# Patient Record
Sex: MALE | Race: WHITE | NOT HISPANIC OR LATINO | Employment: OTHER | URBAN - METROPOLITAN AREA
[De-identification: names, ages, dates, MRNs, and addresses within clinical notes are randomized per-mention and may not be internally consistent; named-entity substitution may affect disease eponyms.]

---

## 2023-04-20 LAB
ALANINE AMINOTRANSFERASE (SGPT) (U/L) IN SER/PLAS: 16 U/L (ref 10–52)
ALBUMIN (G/DL) IN SER/PLAS: 4.6 G/DL (ref 3.4–5)
ALKALINE PHOSPHATASE (U/L) IN SER/PLAS: 63 U/L (ref 33–136)
ANION GAP IN SER/PLAS: 11 MMOL/L (ref 10–20)
ASPARTATE AMINOTRANSFERASE (SGOT) (U/L) IN SER/PLAS: 26 U/L (ref 9–39)
BASOPHILS (10*3/UL) IN BLOOD BY AUTOMATED COUNT: 0.03 X10E9/L (ref 0–0.1)
BASOPHILS/100 LEUKOCYTES IN BLOOD BY AUTOMATED COUNT: 0.7 % (ref 0–2)
BILIRUBIN TOTAL (MG/DL) IN SER/PLAS: 0.6 MG/DL (ref 0–1.2)
CALCIUM (MG/DL) IN SER/PLAS: 9.4 MG/DL (ref 8.6–10.3)
CARBON DIOXIDE, TOTAL (MMOL/L) IN SER/PLAS: 27 MMOL/L (ref 21–32)
CHLORIDE (MMOL/L) IN SER/PLAS: 97 MMOL/L (ref 98–107)
CHOLESTEROL (MG/DL) IN SER/PLAS: 177 MG/DL (ref 0–199)
CHOLESTEROL IN HDL (MG/DL) IN SER/PLAS: 69.4 MG/DL
CHOLESTEROL/HDL RATIO: 2.6
COBALAMIN (VITAMIN B12) (PG/ML) IN SER/PLAS: 1233 PG/ML (ref 211–911)
CREATININE (MG/DL) IN SER/PLAS: 1.12 MG/DL (ref 0.5–1.3)
EOSINOPHILS (10*3/UL) IN BLOOD BY AUTOMATED COUNT: 0.21 X10E9/L (ref 0–0.4)
EOSINOPHILS/100 LEUKOCYTES IN BLOOD BY AUTOMATED COUNT: 4.8 % (ref 0–6)
ERYTHROCYTE DISTRIBUTION WIDTH (RATIO) BY AUTOMATED COUNT: 12.6 % (ref 11.5–14.5)
ERYTHROCYTE MEAN CORPUSCULAR HEMOGLOBIN CONCENTRATION (G/DL) BY AUTOMATED: 32.6 G/DL (ref 32–36)
ERYTHROCYTE MEAN CORPUSCULAR VOLUME (FL) BY AUTOMATED COUNT: 98 FL (ref 80–100)
ERYTHROCYTES (10*6/UL) IN BLOOD BY AUTOMATED COUNT: 4.24 X10E12/L (ref 4.5–5.9)
GFR MALE: 67 ML/MIN/1.73M2
GLUCOSE (MG/DL) IN SER/PLAS: 95 MG/DL (ref 74–99)
HEMATOCRIT (%) IN BLOOD BY AUTOMATED COUNT: 41.7 % (ref 41–52)
HEMOGLOBIN (G/DL) IN BLOOD: 13.6 G/DL (ref 13.5–17.5)
IMMATURE GRANULOCYTES/100 LEUKOCYTES IN BLOOD BY AUTOMATED COUNT: 0.2 % (ref 0–0.9)
LDL: 94 MG/DL (ref 0–99)
LEUKOCYTES (10*3/UL) IN BLOOD BY AUTOMATED COUNT: 4.4 X10E9/L (ref 4.4–11.3)
LYMPHOCYTES (10*3/UL) IN BLOOD BY AUTOMATED COUNT: 0.89 X10E9/L (ref 0.8–3)
LYMPHOCYTES/100 LEUKOCYTES IN BLOOD BY AUTOMATED COUNT: 20.3 % (ref 13–44)
MONOCYTES (10*3/UL) IN BLOOD BY AUTOMATED COUNT: 0.58 X10E9/L (ref 0.05–0.8)
MONOCYTES/100 LEUKOCYTES IN BLOOD BY AUTOMATED COUNT: 13.2 % (ref 2–10)
NEUTROPHILS (10*3/UL) IN BLOOD BY AUTOMATED COUNT: 2.66 X10E9/L (ref 1.6–5.5)
NEUTROPHILS/100 LEUKOCYTES IN BLOOD BY AUTOMATED COUNT: 60.8 % (ref 40–80)
NRBC (PER 100 WBCS) BY AUTOMATED COUNT: 0 /100 WBC (ref 0–0)
PLATELETS (10*3/UL) IN BLOOD AUTOMATED COUNT: 253 X10E9/L (ref 150–450)
POTASSIUM (MMOL/L) IN SER/PLAS: 4.7 MMOL/L (ref 3.5–5.3)
PROTEIN TOTAL: 8.3 G/DL (ref 6.4–8.2)
SEDIMENTATION RATE, ERYTHROCYTE: 11 MM/H (ref 0–20)
SODIUM (MMOL/L) IN SER/PLAS: 130 MMOL/L (ref 136–145)
THYROTROPIN (MIU/L) IN SER/PLAS BY DETECTION LIMIT <= 0.05 MIU/L: 2 MIU/L (ref 0.44–3.98)
TRIGLYCERIDE (MG/DL) IN SER/PLAS: 69 MG/DL (ref 0–149)
UREA NITROGEN (MG/DL) IN SER/PLAS: 21 MG/DL (ref 6–23)
VLDL: 14 MG/DL (ref 0–40)

## 2023-04-27 LAB
ALBUMIN ELP: 4.7 G/DL (ref 3.4–5)
ALPHA 1: 0.3 G/DL (ref 0.2–0.6)
ALPHA 2: 0.8 G/DL (ref 0.4–1.1)
BETA: 0.9 G/DL (ref 0.5–1.2)
GAMMA GLOBULIN: 1.6 G/DL (ref 0.5–1.4)
M-PROTEIN 1: 0.8 G/DL
PATH REVIEW - SERUM IMMUNOFIXATION: NORMAL
PATH REVIEW-SERUM PROTEIN ELECTROPHORESIS: NORMAL
PROTEIN ELECTROPHORESIS INTERPRETATION: ABNORMAL
PROTEIN TOTAL: 8.3 G/DL (ref 6.4–8.2)
SERUM IMMUNOFIXATION INTERPRETATION: ABNORMAL

## 2023-08-18 LAB
ALANINE AMINOTRANSFERASE (SGPT) (U/L) IN SER/PLAS: 16 U/L (ref 10–52)
ALBUMIN (G/DL) IN SER/PLAS: 4.4 G/DL (ref 3.4–5)
ALKALINE PHOSPHATASE (U/L) IN SER/PLAS: 47 U/L (ref 33–136)
ANION GAP IN SER/PLAS: 11 MMOL/L (ref 10–20)
ASPARTATE AMINOTRANSFERASE (SGOT) (U/L) IN SER/PLAS: 24 U/L (ref 9–39)
BASOPHILS (10*3/UL) IN BLOOD BY AUTOMATED COUNT: 0.04 X10E9/L (ref 0–0.1)
BASOPHILS/100 LEUKOCYTES IN BLOOD BY AUTOMATED COUNT: 1 % (ref 0–2)
BILIRUBIN TOTAL (MG/DL) IN SER/PLAS: 0.4 MG/DL (ref 0–1.2)
CALCIUM (MG/DL) IN SER/PLAS: 9.6 MG/DL (ref 8.6–10.3)
CARBON DIOXIDE, TOTAL (MMOL/L) IN SER/PLAS: 29 MMOL/L (ref 21–32)
CHLORIDE (MMOL/L) IN SER/PLAS: 96 MMOL/L (ref 98–107)
CHOLESTEROL (MG/DL) IN SER/PLAS: 163 MG/DL (ref 0–199)
CHOLESTEROL IN HDL (MG/DL) IN SER/PLAS: 63.3 MG/DL
CHOLESTEROL/HDL RATIO: 2.6
CREATININE (MG/DL) IN SER/PLAS: 1.1 MG/DL (ref 0.5–1.3)
EOSINOPHILS (10*3/UL) IN BLOOD BY AUTOMATED COUNT: 0.18 X10E9/L (ref 0–0.4)
EOSINOPHILS/100 LEUKOCYTES IN BLOOD BY AUTOMATED COUNT: 4.7 % (ref 0–6)
ERYTHROCYTE DISTRIBUTION WIDTH (RATIO) BY AUTOMATED COUNT: 12.7 % (ref 11.5–14.5)
ERYTHROCYTE MEAN CORPUSCULAR HEMOGLOBIN CONCENTRATION (G/DL) BY AUTOMATED: 32 G/DL (ref 32–36)
ERYTHROCYTE MEAN CORPUSCULAR VOLUME (FL) BY AUTOMATED COUNT: 98 FL (ref 80–100)
ERYTHROCYTES (10*6/UL) IN BLOOD BY AUTOMATED COUNT: 4.23 X10E12/L (ref 4.5–5.9)
GFR MALE: 68 ML/MIN/1.73M2
GLUCOSE (MG/DL) IN SER/PLAS: 84 MG/DL (ref 74–99)
HEMATOCRIT (%) IN BLOOD BY AUTOMATED COUNT: 41.6 % (ref 41–52)
HEMOGLOBIN (G/DL) IN BLOOD: 13.3 G/DL (ref 13.5–17.5)
IGA (MG/DL) IN SER/PLAS: 190 MG/DL (ref 70–400)
IGG (MG/DL) IN SER/PLAS: 1420 MG/DL (ref 700–1600)
IGM (MG/DL) IN SER/PLAS: 44 MG/DL (ref 40–230)
IMMATURE GRANULOCYTES/100 LEUKOCYTES IN BLOOD BY AUTOMATED COUNT: 0 % (ref 0–0.9)
LDL: 83 MG/DL (ref 0–99)
LEUKOCYTES (10*3/UL) IN BLOOD BY AUTOMATED COUNT: 3.9 X10E9/L (ref 4.4–11.3)
LYMPHOCYTES (10*3/UL) IN BLOOD BY AUTOMATED COUNT: 0.75 X10E9/L (ref 0.8–3)
LYMPHOCYTES/100 LEUKOCYTES IN BLOOD BY AUTOMATED COUNT: 19.5 % (ref 13–44)
MONOCYTES (10*3/UL) IN BLOOD BY AUTOMATED COUNT: 0.44 X10E9/L (ref 0.05–0.8)
MONOCYTES/100 LEUKOCYTES IN BLOOD BY AUTOMATED COUNT: 11.4 % (ref 2–10)
NEUTROPHILS (10*3/UL) IN BLOOD BY AUTOMATED COUNT: 2.44 X10E9/L (ref 1.6–5.5)
NEUTROPHILS/100 LEUKOCYTES IN BLOOD BY AUTOMATED COUNT: 63.4 % (ref 40–80)
NRBC (PER 100 WBCS) BY AUTOMATED COUNT: 0 /100 WBC (ref 0–0)
OSMOLALITY, RANDOM URINE: 387 MOSM/KG (ref 200–1200)
OSMOLALITY, SERUM: 280 MOSM/KG H2O (ref 280–300)
PLATELETS (10*3/UL) IN BLOOD AUTOMATED COUNT: 229 X10E9/L (ref 150–450)
POTASSIUM (MMOL/L) IN SER/PLAS: 5.1 MMOL/L (ref 3.5–5.3)
PROTEIN TOTAL: 7.9 G/DL (ref 6.4–8.2)
SEDIMENTATION RATE, ERYTHROCYTE: 20 MM/H (ref 0–20)
SODIUM (MMOL/L) IN SER/PLAS: 131 MMOL/L (ref 136–145)
TRIGLYCERIDE (MG/DL) IN SER/PLAS: 83 MG/DL (ref 0–149)
UREA NITROGEN (MG/DL) IN SER/PLAS: 16 MG/DL (ref 6–23)
VLDL: 17 MG/DL (ref 0–40)

## 2023-08-23 LAB
ALBUMIN ELP: 4.6 G/DL (ref 3.4–5)
ALPHA 1: 0.3 G/DL (ref 0.2–0.6)
ALPHA 2: 0.7 G/DL (ref 0.4–1.1)
BETA: 0.8 G/DL (ref 0.5–1.2)
GAMMA GLOBULIN: 1.5 G/DL (ref 0.5–1.4)
M-PROTEIN 1: 0.8 G/DL
PATH REVIEW-SERUM PROTEIN ELECTROPHORESIS: NORMAL
PROTEIN ELECTROPHORESIS INTERPRETATION: ABNORMAL
PROTEIN TOTAL: 7.9 G/DL (ref 6.4–8.2)

## 2023-08-27 PROBLEM — R29.898 COGWHEEL RIGIDITY: Status: ACTIVE | Noted: 2023-08-27

## 2023-08-27 PROBLEM — J34.89 NASAL OBSTRUCTION: Status: ACTIVE | Noted: 2023-08-27

## 2023-08-27 PROBLEM — I25.10 CORONARY ARTERY ARTERIOSCLEROSIS: Status: ACTIVE | Noted: 2023-08-27

## 2023-08-27 PROBLEM — I48.0 PAROXYSMAL ATRIAL FIBRILLATION (MULTI): Status: ACTIVE | Noted: 2023-08-27

## 2023-08-27 PROBLEM — E78.01 FAMILIAL HYPERCHOLESTEREMIA: Status: ACTIVE | Noted: 2023-08-27

## 2023-08-27 PROBLEM — E87.1 HYPONATREMIA: Status: ACTIVE | Noted: 2023-08-27

## 2023-08-27 PROBLEM — M65.30 RIGHT TRIGGER FINGER: Status: ACTIVE | Noted: 2023-08-27

## 2023-08-27 PROBLEM — J34.2 NASAL SEPTAL DEVIATION: Status: ACTIVE | Noted: 2023-08-27

## 2023-08-27 PROBLEM — I47.19 ATRIAL TACHYCARDIA, PAROXYSMAL (CMS-HCC): Status: ACTIVE | Noted: 2023-08-27

## 2023-08-27 PROBLEM — I49.1 PAC (PREMATURE ATRIAL CONTRACTION): Status: ACTIVE | Noted: 2023-08-27

## 2023-08-27 PROBLEM — K52.9 CHRONIC DIARRHEA: Status: ACTIVE | Noted: 2023-08-27

## 2023-08-27 PROBLEM — I48.92 ATRIAL FLUTTER (MULTI): Status: ACTIVE | Noted: 2023-08-27

## 2023-08-27 PROBLEM — I49.3 PVC (PREMATURE VENTRICULAR CONTRACTION): Status: ACTIVE | Noted: 2023-08-27

## 2023-08-27 PROBLEM — D75.89 MACROCYTOSIS WITHOUT ANEMIA: Status: ACTIVE | Noted: 2023-08-27

## 2023-08-27 PROBLEM — I50.42 CHRONIC COMBINED SYSTOLIC AND DIASTOLIC HEART FAILURE, NYHA CLASS 2 (MULTI): Status: ACTIVE | Noted: 2023-08-27

## 2023-08-27 PROBLEM — E78.5 HYPERLIPIDEMIA: Status: ACTIVE | Noted: 2023-08-27

## 2023-08-27 PROBLEM — I20.9 ANGINA PECTORIS (CMS-HCC): Status: ACTIVE | Noted: 2023-08-27

## 2023-08-27 PROBLEM — I48.19 PERSISTENT ATRIAL FIBRILLATION (MULTI): Status: ACTIVE | Noted: 2023-08-27

## 2023-08-27 PROBLEM — I50.20 SYSTOLIC HEART FAILURE (MULTI): Status: ACTIVE | Noted: 2023-08-27

## 2023-08-27 PROBLEM — D64.9 ANEMIA: Status: ACTIVE | Noted: 2023-08-27

## 2023-08-27 PROBLEM — J44.9 COPD (CHRONIC OBSTRUCTIVE PULMONARY DISEASE) (MULTI): Status: ACTIVE | Noted: 2023-08-27

## 2023-08-27 PROBLEM — M65.311 TRIGGER FINGER OF RIGHT THUMB: Status: ACTIVE | Noted: 2023-08-27

## 2023-08-27 PROBLEM — D47.2 MONOCLONAL GAMMOPATHY OF UNKNOWN SIGNIFICANCE: Status: ACTIVE | Noted: 2023-08-27

## 2023-08-27 PROBLEM — Z95.810 ICD (IMPLANTABLE CARDIOVERTER-DEFIBRILLATOR) IN PLACE: Status: ACTIVE | Noted: 2023-08-27

## 2023-08-27 PROBLEM — N52.9 ERECTILE DYSFUNCTION: Status: ACTIVE | Noted: 2023-08-27

## 2023-08-27 PROBLEM — I10 HTN (HYPERTENSION): Status: ACTIVE | Noted: 2023-08-27

## 2023-08-27 PROBLEM — J32.9 CHRONIC SINUSITIS: Status: ACTIVE | Noted: 2023-08-27

## 2023-08-27 PROBLEM — I25.5 ISCHEMIC CARDIOMYOPATHY: Status: ACTIVE | Noted: 2023-08-27

## 2023-08-27 PROBLEM — K40.90 LEFT INGUINAL HERNIA: Status: ACTIVE | Noted: 2023-08-27

## 2023-08-27 PROBLEM — M25.562 KNEE PAIN, LEFT: Status: ACTIVE | Noted: 2023-08-27

## 2023-08-27 RX ORDER — MULTIVITAMIN
1 TABLET ORAL DAILY
COMMUNITY
Start: 2021-03-29

## 2023-08-27 RX ORDER — BIOTIN 1 MG
1 TABLET ORAL DAILY
COMMUNITY
End: 2024-04-01 | Stop reason: WASHOUT

## 2023-08-27 RX ORDER — PLANT STANOL ESTER 450 MG
8000 TABLET ORAL DAILY
COMMUNITY
Start: 2021-03-29

## 2023-08-27 RX ORDER — SPIRONOLACTONE 25 MG/1
12.5 TABLET ORAL DAILY
COMMUNITY
End: 2024-03-04

## 2023-08-27 RX ORDER — EPINEPHRINE 0.22MG
100 AEROSOL WITH ADAPTER (ML) INHALATION DAILY
COMMUNITY

## 2023-08-27 RX ORDER — CARVEDILOL 6.25 MG/1
6.25 TABLET ORAL 2 TIMES DAILY
COMMUNITY
End: 2023-10-05 | Stop reason: ALTCHOICE

## 2023-08-27 RX ORDER — ROSUVASTATIN CALCIUM 20 MG/1
1 TABLET, COATED ORAL DAILY
COMMUNITY
Start: 2016-10-03

## 2023-08-27 RX ORDER — METHYLPREDNISOLONE 4 MG/1
TABLET ORAL
COMMUNITY
Start: 2022-10-20 | End: 2023-10-05 | Stop reason: ALTCHOICE

## 2023-08-27 RX ORDER — LANOLIN ALCOHOL/MO/W.PET/CERES
100 CREAM (GRAM) TOPICAL DAILY
COMMUNITY
End: 2024-01-03 | Stop reason: ENTERED-IN-ERROR

## 2023-08-27 RX ORDER — METOPROLOL SUCCINATE 25 MG/1
1 TABLET, EXTENDED RELEASE ORAL DAILY
COMMUNITY
Start: 2022-09-20

## 2023-08-27 RX ORDER — PETROLATUM,WHITE/LANOLIN
2 OINTMENT (GRAM) TOPICAL DAILY
COMMUNITY

## 2023-08-27 RX ORDER — DABIGATRAN ETEXILATE 150 MG/1
150 CAPSULE ORAL 2 TIMES DAILY
COMMUNITY
Start: 2022-09-23 | End: 2023-10-05 | Stop reason: ALTCHOICE

## 2023-08-27 RX ORDER — SACUBITRIL AND VALSARTAN 97; 103 MG/1; MG/1
1 TABLET, FILM COATED ORAL 2 TIMES DAILY
COMMUNITY
Start: 2017-01-12 | End: 2023-11-14 | Stop reason: SDUPTHER

## 2023-08-27 RX ORDER — FERROUS SULFATE 325(65) MG
65 TABLET ORAL DAILY
COMMUNITY
End: 2024-01-03 | Stop reason: ENTERED-IN-ERROR

## 2023-08-27 RX ORDER — ISOSORBIDE MONONITRATE 30 MG/1
30 TABLET, EXTENDED RELEASE ORAL EVERY MORNING
COMMUNITY
End: 2023-10-05 | Stop reason: ALTCHOICE

## 2023-08-27 RX ORDER — GLUCOSAMINE/CHONDR SU A SOD 167-133 MG
CAPSULE ORAL DAILY
COMMUNITY
End: 2023-10-05 | Stop reason: ALTCHOICE

## 2023-08-27 RX ORDER — DOXYCYCLINE HYCLATE 100 MG
1 TABLET ORAL EVERY 12 HOURS
COMMUNITY
Start: 2022-10-20 | End: 2024-01-03 | Stop reason: ENTERED-IN-ERROR

## 2023-08-27 RX ORDER — FUROSEMIDE 20 MG/1
1 TABLET ORAL DAILY PRN
COMMUNITY
Start: 2021-10-26

## 2023-10-05 ENCOUNTER — OFFICE VISIT (OUTPATIENT)
Dept: CARDIOLOGY | Facility: CLINIC | Age: 78
End: 2023-10-05
Payer: MEDICARE

## 2023-10-05 VITALS
DIASTOLIC BLOOD PRESSURE: 72 MMHG | SYSTOLIC BLOOD PRESSURE: 126 MMHG | HEIGHT: 69 IN | HEART RATE: 76 BPM | BODY MASS INDEX: 23.4 KG/M2 | OXYGEN SATURATION: 96 % | WEIGHT: 158 LBS

## 2023-10-05 DIAGNOSIS — E78.5 HYPERLIPIDEMIA: ICD-10-CM

## 2023-10-05 DIAGNOSIS — I48.0 PAROXYSMAL ATRIAL FIBRILLATION (MULTI): ICD-10-CM

## 2023-10-05 DIAGNOSIS — N52.9 ERECTILE DISORDER: ICD-10-CM

## 2023-10-05 DIAGNOSIS — I50.42 CHRONIC COMBINED SYSTOLIC AND DIASTOLIC HEART FAILURE, NYHA CLASS 2 (MULTI): Primary | ICD-10-CM

## 2023-10-05 DIAGNOSIS — I25.10 CAD (CORONARY ARTERY DISEASE): ICD-10-CM

## 2023-10-05 PROCEDURE — 1159F MED LIST DOCD IN RCRD: CPT | Performed by: INTERNAL MEDICINE

## 2023-10-05 PROCEDURE — 1160F RVW MEDS BY RX/DR IN RCRD: CPT | Performed by: INTERNAL MEDICINE

## 2023-10-05 PROCEDURE — 3074F SYST BP LT 130 MM HG: CPT | Performed by: INTERNAL MEDICINE

## 2023-10-05 PROCEDURE — 3078F DIAST BP <80 MM HG: CPT | Performed by: INTERNAL MEDICINE

## 2023-10-05 PROCEDURE — 1036F TOBACCO NON-USER: CPT | Performed by: INTERNAL MEDICINE

## 2023-10-05 PROCEDURE — 1126F AMNT PAIN NOTED NONE PRSNT: CPT | Performed by: INTERNAL MEDICINE

## 2023-10-05 PROCEDURE — 99214 OFFICE O/P EST MOD 30 MIN: CPT | Performed by: INTERNAL MEDICINE

## 2023-10-05 RX ORDER — SILDENAFIL 50 MG/1
50 TABLET, FILM COATED ORAL DAILY PRN
Qty: 50 TABLET | Refills: 1 | Status: SHIPPED | OUTPATIENT
Start: 2023-10-05 | End: 2023-10-05 | Stop reason: SDUPTHER

## 2023-10-05 RX ORDER — SILDENAFIL 50 MG/1
50 TABLET, FILM COATED ORAL DAILY PRN
Qty: 50 TABLET | Refills: 1 | Status: SHIPPED | OUTPATIENT
Start: 2023-10-05 | End: 2024-04-30 | Stop reason: WASHOUT

## 2023-10-05 ASSESSMENT — PAIN SCALES - GENERAL: PAINLEVEL: 0-NO PAIN

## 2023-10-05 ASSESSMENT — ENCOUNTER SYMPTOMS
DEPRESSION: 0
OCCASIONAL FEELINGS OF UNSTEADINESS: 1
LOSS OF SENSATION IN FEET: 0

## 2023-10-05 NOTE — PROGRESS NOTES
"Chief Complaint:   No chief complaint on file.     History Of Present Illness:    Ab Kendrick is a 78 y.o. male with a history of chronic systolic and diastolic heart failure, CAD with prior LAD and RCA stenting (anterolateral STEMI 12/2/2010 with PCI to the LAD and staged PCI to the RCA), dyslipidemia, hypertension, ventricular tachycardia with [employer] ICD (MRI safe), chronic atrial fibrillation on long-term oral anticoagulation, and pulmonary hypertension here for routine follow-up.     He has done really well with the changes in his medicines.  He denies any exertional chest pain or shortness of breath.      He has been diagnosed with essential tremor and was asked to bring up the idea of using propranolol.  We talked about that.    He also notices that he is now walking a straight line when he starts to walk.  This developed after he saw his neurologist.    He also notices low back pain with doing things like planting 200 garlic bulbs    He is also noticed erectile dysfunction since starting on the Entresto and spironolactone.     Echocardiogram 9/27/2021: EF 38% with moderately dilated LV. Mild left atrial enlargement. ICD/pacer leads noted in the RA and RV. Mild MR and moderate TR. Trace AI. RVSP 57 mmHg.     Catheterization 1/31/2017: Patent LAD and RCA stents. Moderate pulmonary hypertension with elevated LVEDP of 25 mmHg. EF 20%.     PCI to the LAD 12/2/2010 with HUGH. PCI to RCA 5/19/2011      Last Recorded Vitals:  Vitals:    10/05/23 1103   BP: 126/72   BP Location: Right arm   Patient Position: Sitting   Pulse: 76   SpO2: 96%   Weight: 71.7 kg (158 lb)   Height: 1.753 m (5' 9\")       Past Medical History:  He has a past medical history of CAD (coronary artery disease) (08/27/2023), Chronic combined systolic and diastolic heart failure, NYHA class 2 (CMS/HCC) (08/27/2023), Chronic rhinitis, Diverticulosis of intestine, part unspecified, without perforation or abscess without bleeding, HTN " (hypertension) (08/27/2023), Hyperlipidemia (08/27/2023), and Persistent atrial fibrillation (CMS/HCC) (08/27/2023).    Past Surgical History:  He has a past surgical history that includes Other surgical history (10/19/2021); Other surgical history (10/19/2021); Other surgical history (10/19/2021); Other surgical history (10/19/2021); Other surgical history (10/19/2021); Coronary angioplasty with stent (12/02/2010); Coronary angioplasty with stent (05/19/2011); and Cardiac catheterization (01/31/2017).      Social History:  He reports that he has never smoked. He has never used smokeless tobacco. He reports current alcohol use of about 8.0 standard drinks of alcohol per week. He reports that he does not use drugs.    Family History:  Family History   Problem Relation Name Age of Onset    Prostate cancer Brother          Allergies:  Patient has no known allergies.    Outpatient Medications:  Current Outpatient Medications   Medication Instructions    apixaban (ELIQUIS) 5 mg, oral, 2 times daily    biotin 1 mg tablet 1 tablet, oral, Daily    carvedilol (COREG) 6.25 mg, oral, 2 times daily    coenzyme Q-10 100 mg, oral, Daily    cyanocobalamin (VITAMIN B-12) 100 mcg, oral, Daily    dabigatran etexilate (Pradaxa) 150 mg capsule 1 capsule, oral, 2 times daily    doxycycline (Vibra-Tabs) 100 mg tablet 1 tablet, oral, Every 12 hours    ferrous sulfate 325 (65 Fe) MG tablet 65 mg of iron, oral, Daily, With food<BR>    furosemide (Lasix) 20 mg tablet 1 tablet, oral, Daily PRN    glucosamine sulfate 1,000 mg capsule 2 capsules, oral, Daily    isosorbide mononitrate ER (IMDUR) 30 mg, oral, Every morning    methylPREDNISolone (Medrol Dospak) 4 mg tablets oral    metoprolol succinate XL (Toprol-XL) 25 mg 24 hr tablet 1 tablet, oral, Daily    multivitamin tablet 1 tablet, oral, Daily    niacin 500 mg tablet oral, Daily    rosuvastatin (Crestor) 20 mg tablet 1 tablet, oral, Daily    sacubitriL-valsartan (Entresto)  mg tablet  "1 tablet, oral, 2 times daily    spironolactone (ALDACTONE) 12.5 mg, oral, Daily    vitamin A 8,000 Units, oral, Daily       Physical Exam:  In general: alert and in no acute distress.   HEENT: Mucous membranes moist, carotid upstrokes normal with no bruits. JVP is mildly elevated no cervical lymphadenopathy. Cranial nerves II-XII grossly intact.  Pulmonary: Clear to auscultation bilaterally.  Cardiovascular: S1,S2, regular. 2 out of 6 systolic murmur at the apex.  Lower extremities: Warm. 2+ distal pulses. No edema.  Skin: warm and dry. No obvious rashes visualized.  Psychiatric: Oriented to person, place and time. No obvious agitation.     Last Labs:  CBC -  Lab Results   Component Value Date    WBC 3.9 (L) 08/18/2023    HGB 13.3 (L) 08/18/2023    HCT 41.6 08/18/2023    MCV 98 08/18/2023     08/18/2023       CMP -  Lab Results   Component Value Date    CALCIUM 9.6 08/18/2023    PROT 7.9 08/18/2023    PROT 7.9 08/18/2023    ALBUMIN 4.4 08/18/2023    AST 24 08/18/2023    ALT 16 08/18/2023    ALKPHOS 47 08/18/2023    BILITOT 0.4 08/18/2023       LIPID PANEL -   Lab Results   Component Value Date    CHOL 163 08/18/2023    TRIG 83 08/18/2023    HDL 63.3 08/18/2023    CHHDL 2.6 08/18/2023    LDLF 83 08/18/2023    VLDL 17 08/18/2023       RENAL FUNCTION PANEL -   Lab Results   Component Value Date    GLUCOSE 84 08/18/2023     (L) 08/18/2023    K 5.1 08/18/2023    CL 96 (L) 08/18/2023    CO2 29 08/18/2023    ANIONGAP 11 08/18/2023    BUN 16 08/18/2023    CREATININE 1.10 08/18/2023    GFRMALE 68 08/18/2023    CALCIUM 9.6 08/18/2023    ALBUMIN 4.4 08/18/2023        No results found for: \"BNP\", \"HGBA1C\"      Assessment/Plan   1) chronic combined systolic and diastolic heart failure: He is euvolemic.  He is really doing well with his current regimen.  Would like to hold off on SGLT2 inhibitors at this time.     2) CAD: Stable from a symptom standpoint. Based on the ISCHEMIA Trial there is no need for routine " stress testing.     3) dyslipidemia: Continue with rosuvastatin 20 mg daily.     4) paroxysmal atrial fibrillation: Getting anticoagulant from [facility].       5) erectile dysfunction: Given prescription for Viagra    6) low back pain: Given instructions to try some physical therapy for his low back.    7) follow-up: 6 months or sooner if needed       Rodriguez Clements MD

## 2023-11-09 ENCOUNTER — HOSPITAL ENCOUNTER (OUTPATIENT)
Dept: CARDIOLOGY | Facility: HOSPITAL | Age: 78
Discharge: HOME | End: 2023-11-09
Payer: MEDICARE

## 2023-11-09 DIAGNOSIS — Z95.810 PRESENCE OF AUTOMATIC CARDIOVERTER/DEFIBRILLATOR (AICD): Primary | ICD-10-CM

## 2023-11-09 DIAGNOSIS — Z95.810 PRESENCE OF AUTOMATIC CARDIOVERTER/DEFIBRILLATOR (AICD): ICD-10-CM

## 2023-11-09 DIAGNOSIS — I47.29 VENTRICULAR TACHYCARDIA (PAROXYSMAL) (MULTI): ICD-10-CM

## 2023-11-09 PROCEDURE — 93296 REM INTERROG EVL PM/IDS: CPT

## 2023-11-09 PROCEDURE — 93295 DEV INTERROG REMOTE 1/2/MLT: CPT | Performed by: INTERNAL MEDICINE

## 2023-11-14 DIAGNOSIS — I50.42 CHRONIC COMBINED SYSTOLIC AND DIASTOLIC HEART FAILURE, NYHA CLASS 2 (MULTI): Primary | ICD-10-CM

## 2023-11-14 DIAGNOSIS — N52.9 ERECTILE DISORDER: ICD-10-CM

## 2023-11-14 RX ORDER — SACUBITRIL AND VALSARTAN 97; 103 MG/1; MG/1
1 TABLET, FILM COATED ORAL 2 TIMES DAILY
Qty: 180 TABLET | Refills: 3 | Status: SHIPPED | OUTPATIENT
Start: 2023-11-14 | End: 2024-04-11 | Stop reason: SDUPTHER

## 2023-11-21 ENCOUNTER — OFFICE VISIT (OUTPATIENT)
Dept: SURGERY | Facility: CLINIC | Age: 78
End: 2023-11-21
Payer: MEDICARE

## 2023-11-21 VITALS
DIASTOLIC BLOOD PRESSURE: 71 MMHG | HEART RATE: 76 BPM | BODY MASS INDEX: 23.95 KG/M2 | WEIGHT: 158 LBS | OXYGEN SATURATION: 99 % | HEIGHT: 68 IN | SYSTOLIC BLOOD PRESSURE: 113 MMHG | RESPIRATION RATE: 16 BRPM | TEMPERATURE: 97.4 F

## 2023-11-21 DIAGNOSIS — I48.91 ATRIAL FIBRILLATION AND FLUTTER (MULTI): ICD-10-CM

## 2023-11-21 DIAGNOSIS — K40.90 NON-RECURRENT UNILATERAL INGUINAL HERNIA WITHOUT OBSTRUCTION OR GANGRENE: Primary | ICD-10-CM

## 2023-11-21 DIAGNOSIS — I48.92 ATRIAL FIBRILLATION AND FLUTTER (MULTI): ICD-10-CM

## 2023-11-21 PROCEDURE — 1159F MED LIST DOCD IN RCRD: CPT | Performed by: SURGERY

## 2023-11-21 PROCEDURE — 1036F TOBACCO NON-USER: CPT | Performed by: SURGERY

## 2023-11-21 PROCEDURE — 1126F AMNT PAIN NOTED NONE PRSNT: CPT | Performed by: SURGERY

## 2023-11-21 PROCEDURE — 3074F SYST BP LT 130 MM HG: CPT | Performed by: SURGERY

## 2023-11-21 PROCEDURE — 3078F DIAST BP <80 MM HG: CPT | Performed by: SURGERY

## 2023-11-21 PROCEDURE — 1160F RVW MEDS BY RX/DR IN RCRD: CPT | Performed by: SURGERY

## 2023-11-21 PROCEDURE — 99204 OFFICE O/P NEW MOD 45 MIN: CPT | Performed by: SURGERY

## 2023-11-21 RX ORDER — DABIGATRAN ETEXILATE 150 MG/1
150 CAPSULE ORAL 2 TIMES DAILY
COMMUNITY
End: 2024-01-19 | Stop reason: SDUPTHER

## 2023-11-21 RX ORDER — DAPAGLIFLOZIN 10 MG/1
10 TABLET, FILM COATED ORAL DAILY
COMMUNITY
End: 2024-01-19 | Stop reason: SDUPTHER

## 2023-11-22 NOTE — PROGRESS NOTES
Ab Kendrick   [id number]   1945         Chief Complaint/      Right inguinal hernia            HPI/    78-year-old male seen for right inguinal hernia    Patient had the hernia become an issue over the last year.  He has a lump in the right groin.  Over the last several weeks the lump is out more often than not    Patient denies any change in bowel urinary tract infection    Hernia does not hurt    Patient is status repair of a left inguinal hernia    Past Medical History:   Diagnosis Date    CAD (coronary artery disease) 08/27/2023    Chronic combined systolic and diastolic heart failure, NYHA class 2 (CMS/[facility]) 08/27/2023    Chronic rhinitis     Rhinitis    Diverticulosis of intestine, part unspecified, without perforation or abscess without bleeding     Diverticulosis    HTN (hypertension) 08/27/2023    Hyperlipidemia 08/27/2023    Persistent atrial fibrillation (CMS/[facility]) 08/27/2023      Coronary artery disease      History of stents    Dysrhythmia atrial fibrillation     Chronic anticoagulation    Hypertension    Elevated lipids    Denies diabetes or stroke    Status post repair of left inguinal hernia  Social History     Socioeconomic History    Marital status:      Spouse name: Not on file    Number of children: Not on file    Years of education: Not on file    Highest education level: Not on file   Occupational History    Not on file   Tobacco Use    Smoking status: Never    Smokeless tobacco: Never   Substance and Sexual Activity    Alcohol use: Yes     Alcohol/week: 8.0 standard drinks of alcohol     Types: 8 Cans of beer per week    Drug use: Never    Sexual activity: Not on file   Other Topics Concern    Not on file   Social History Narrative    Not on file     Social Determinants of Health     Financial Resource Strain: Not on file   Food Insecurity: Not on file   Transportation Needs: Not on file   Physical Activity: Not on file   Stress: Not on file   Social Connections: Not on file    Intimate Partner Violence: Not on file   Housing Stability: Not on file        Non-smoker  Family History   Problem Relation Name Age of Onset    Prostate cancer Brother          Review of Systems   All other systems reviewed and are negative.         Current Outpatient Medications:     biotin 1 mg tablet, Take 1 tablet (1 mg) by mouth once daily., Disp: , Rfl:     coenzyme Q-10 100 mg capsule, Take 1 capsule (100 mg) by mouth once daily., Disp: , Rfl:     cyanocobalamin (Vitamin B-12) 1,000 mcg tablet, Take 100 mcg by mouth once daily., Disp: , Rfl:     furosemide (Lasix) 20 mg tablet, Take 1 tablet (20 mg) by mouth once daily as needed., Disp: , Rfl:     glucosamine sulfate 1,000 mg capsule, Take 2 capsules by mouth once daily., Disp: , Rfl:     metoprolol succinate XL (Toprol-XL) 25 mg 24 hr tablet, Take 1 tablet (25 mg) by mouth once daily., Disp: , Rfl:     multivitamin tablet, Take 1 tablet by mouth once daily., Disp: , Rfl:     rosuvastatin (Crestor) 20 mg tablet, Take 1 tablet (20 mg) by mouth once daily., Disp: , Rfl:     sacubitriL-valsartan (Entresto)  mg tablet, Take 1 tablet by mouth 2 times a day., Disp: 180 tablet, Rfl: 3    spironolactone (Aldactone) 25 mg tablet, Take 0.5 tablets (12.5 mg) by mouth once daily., Disp: , Rfl:     vitamin A 2,400 mcg capsule, Take 1 capsule (2.4 mg) by mouth once daily., Disp: , Rfl:     dabigatran etexilate (Pradaxa) 150 mg capsule, Take 1 capsule (150 mg) by mouth once daily. Do not crush or chew., Disp: , Rfl:     dapagliflozin propanediol (Farxiga) 10 mg, Take by mouth., Disp: , Rfl:     doxycycline (Vibra-Tabs) 100 mg tablet, Take 1 tablet (100 mg) by mouth every 12 hours., Disp: , Rfl:     ferrous sulfate 325 (65 Fe) MG tablet, Take 1 tablet (65 mg of iron) by mouth once daily. With food, Disp: , Rfl:     sildenafil (Viagra) 50 mg tablet, Take 1 tablet (50 mg) by mouth once daily as needed for erectile dysfunction., Disp: 50 tablet, Rfl: 1     No Known  "Allergies     XR fingers  and hand  Narrative: Interpreted By:  ERICH XIE MD  MRN: [id number]  Patient Name: GRACIELA BAKER     STUDY:  Left hand, 3 views.     INDICATION:  hand pain  M79.642: Left hand pain.     COMPARISON:  None.     ACCESSION NUMBER(S):  [id number]     ORDERING CLINICIAN:  MEGHANA ROBERTSON     FINDINGS:  No acute fracture. Mild ulnar deviation of the 3rd digit middle and  distal phalanges.  Small heterotopic ossification is visualized adjacent to the 3rd  metacarpal head.  Mild 3rd MCP joint degenerative changes with mild joint space  narrowing.  Soft tissues are within normal limits.     Impression: 1. Mild 3rd MCP joint degenerative changes.  2. Additional findings as above.       [unfilled]     /71 (BP Location: Right arm, Patient Position: Sitting, BP Cuff Size: Adult)   Pulse 76   Temp 36.3 °C (97.4 °F) (Temporal)   Resp 16   Ht 1.727 m (5' 8\")   Wt 71.7 kg (158 lb)   SpO2 99%   BMI 24.02 kg/m²        Physical Exam  Constitutional:       Appearance: Normal appearance.   HENT:      Head: Normocephalic and atraumatic.   Cardiovascular:      Rate and Rhythm: Normal rate and regular rhythm.   Pulmonary:      Effort: Pulmonary effort is normal.      Breath sounds: Normal breath sounds.   Abdominal:      Comments: Hernia and right groin noted    Does not completely reduce    Nontender    Left groin scar noted, no left inguinal hernia   Neurological:      Mental Status: He is alert.                  Assessment/    Right inguinal hernia    Coronary artery disease  Atrial fibrillation  Chronic anticoagulation    Plan/    Will need to contact the patient's cardiologist or to hold the anticoagulant prior to surgery.    After clearance obtained would proceed open repair    Patient request surgery to be deferred until January.  Danger of delay to include possible incarceration event reviewed with the patient.  Again he like to have his surgery in January and understands " incarceration    Hernia surgery with the patient in detail.  The risk benefits indications for surgery reviewed with him.  Potential bleeding infection MI PE death etc. reviewed.  The anticipated convalescence is reviewed.  Use of mesh and prosthetic materials is reviewed.  Patient understands to be at risk for bruising and possible bleeding given the anticoagulants.  All questions were answered and consent was obtained

## 2023-12-01 DIAGNOSIS — K40.90 NON-RECURRENT UNILATERAL INGUINAL HERNIA WITHOUT OBSTRUCTION OR GANGRENE: ICD-10-CM

## 2023-12-05 PROBLEM — K40.90 NON-RECURRENT UNILATERAL INGUINAL HERNIA WITHOUT OBSTRUCTION OR GANGRENE: Status: ACTIVE | Noted: 2023-11-21

## 2023-12-06 ENCOUNTER — TELEPHONE (OUTPATIENT)
Dept: SURGERY | Facility: CLINIC | Age: 78
End: 2023-12-06
Payer: MEDICARE

## 2023-12-06 NOTE — TELEPHONE ENCOUNTER
Pt aware that Dr. Floyd Rob gave cardiac clearance and he is to stop pradaxa 2 days prior to surgery.  Pt expresses verbal understanding.

## 2023-12-08 ENCOUNTER — HOSPITAL ENCOUNTER (OUTPATIENT)
Dept: CARDIOLOGY | Facility: HOSPITAL | Age: 78
Discharge: HOME | End: 2023-12-08
Payer: MEDICARE

## 2023-12-08 DIAGNOSIS — K40.90 NON-RECURRENT UNILATERAL INGUINAL HERNIA WITHOUT OBSTRUCTION OR GANGRENE: ICD-10-CM

## 2023-12-08 PROCEDURE — 93010 ELECTROCARDIOGRAM REPORT: CPT | Performed by: INTERNAL MEDICINE

## 2023-12-08 PROCEDURE — 93005 ELECTROCARDIOGRAM TRACING: CPT

## 2024-01-03 ENCOUNTER — LAB (OUTPATIENT)
Dept: LAB | Facility: LAB | Age: 79
End: 2024-01-03
Payer: MEDICARE

## 2024-01-03 ENCOUNTER — PRE-ADMISSION TESTING (OUTPATIENT)
Dept: PREADMISSION TESTING | Facility: HOSPITAL | Age: 79
End: 2024-01-03
Payer: MEDICARE

## 2024-01-03 VITALS
HEART RATE: 64 BPM | DIASTOLIC BLOOD PRESSURE: 68 MMHG | SYSTOLIC BLOOD PRESSURE: 118 MMHG | OXYGEN SATURATION: 98 % | WEIGHT: 161.16 LBS | RESPIRATION RATE: 18 BRPM | TEMPERATURE: 97.7 F | HEIGHT: 69 IN | BODY MASS INDEX: 23.87 KG/M2

## 2024-01-03 DIAGNOSIS — K40.90 NON-RECURRENT UNILATERAL INGUINAL HERNIA WITHOUT OBSTRUCTION OR GANGRENE: ICD-10-CM

## 2024-01-03 DIAGNOSIS — Z01.818 PRE-OP TESTING: Primary | ICD-10-CM

## 2024-01-03 DIAGNOSIS — I48.91 ATRIAL FIBRILLATION AND FLUTTER (MULTI): ICD-10-CM

## 2024-01-03 DIAGNOSIS — I48.92 ATRIAL FIBRILLATION AND FLUTTER (MULTI): ICD-10-CM

## 2024-01-03 LAB
ANION GAP SERPL CALC-SCNC: 13 MMOL/L (ref 10–20)
APTT PPP: 42 SECONDS (ref 27–38)
BUN SERPL-MCNC: 17 MG/DL (ref 6–23)
CALCIUM SERPL-MCNC: 10 MG/DL (ref 8.6–10.3)
CHLORIDE SERPL-SCNC: 97 MMOL/L (ref 98–107)
CO2 SERPL-SCNC: 29 MMOL/L (ref 21–32)
CREAT SERPL-MCNC: 1.04 MG/DL (ref 0.5–1.3)
ERYTHROCYTE [DISTWIDTH] IN BLOOD BY AUTOMATED COUNT: 12.6 % (ref 11.5–14.5)
GFR SERPL CREATININE-BSD FRML MDRD: 73 ML/MIN/1.73M*2
GLUCOSE SERPL-MCNC: 101 MG/DL (ref 74–99)
HCT VFR BLD AUTO: 45.1 % (ref 41–52)
HGB BLD-MCNC: 14.4 G/DL (ref 13.5–17.5)
INR PPP: 1.1 (ref 0.9–1.1)
MCH RBC QN AUTO: 31.7 PG (ref 26–34)
MCHC RBC AUTO-ENTMCNC: 31.9 G/DL (ref 32–36)
MCV RBC AUTO: 99 FL (ref 80–100)
NRBC BLD-RTO: 0 /100 WBCS (ref 0–0)
PLATELET # BLD AUTO: 241 X10*3/UL (ref 150–450)
POTASSIUM SERPL-SCNC: 4.7 MMOL/L (ref 3.5–5.3)
PROTHROMBIN TIME: 12.9 SECONDS (ref 9.8–12.8)
RBC # BLD AUTO: 4.54 X10*6/UL (ref 4.5–5.9)
SODIUM SERPL-SCNC: 134 MMOL/L (ref 136–145)
WBC # BLD AUTO: 4.5 X10*3/UL (ref 4.4–11.3)

## 2024-01-03 PROCEDURE — 85610 PROTHROMBIN TIME: CPT

## 2024-01-03 PROCEDURE — 36415 COLL VENOUS BLD VENIPUNCTURE: CPT

## 2024-01-03 PROCEDURE — 93010 ELECTROCARDIOGRAM REPORT: CPT | Performed by: INTERNAL MEDICINE

## 2024-01-03 PROCEDURE — 99214 OFFICE O/P EST MOD 30 MIN: CPT | Performed by: NURSE PRACTITIONER

## 2024-01-03 PROCEDURE — 80048 BASIC METABOLIC PNL TOTAL CA: CPT

## 2024-01-03 PROCEDURE — 85027 COMPLETE CBC AUTOMATED: CPT

## 2024-01-03 PROCEDURE — 87081 CULTURE SCREEN ONLY: CPT | Mod: STJLAB

## 2024-01-03 PROCEDURE — 93005 ELECTROCARDIOGRAM TRACING: CPT

## 2024-01-03 PROCEDURE — 85730 THROMBOPLASTIN TIME PARTIAL: CPT

## 2024-01-03 RX ORDER — CHLORHEXIDINE GLUCONATE ORAL RINSE 1.2 MG/ML
SOLUTION DENTAL
Qty: 473 ML | Refills: 0 | Status: SHIPPED | OUTPATIENT
Start: 2024-01-03 | End: 2024-04-01 | Stop reason: WASHOUT

## 2024-01-03 ASSESSMENT — DUKE ACTIVITY SCORE INDEX (DASI)
CAN YOU DO LIGHT WORK AROUND THE HOUSE LIKE DUSTING OR WASHING DISHES: YES
CAN YOU RUN A SHORT DISTANCE: YES
CAN YOU PARTICIPATE IN MODERATE RECREATIONAL ACTIVITIES LIKE GOLF, BOWLING, DANCING, DOUBLES TENNIS OR THROWING A BASEBALL OR FOOTBALL: NO
CAN YOU DO MODERATE WORK AROUND THE HOUSE LIKE VACUUMING, SWEEPING FLOORS OR CARRYING GROCERIES: YES
CAN YOU HAVE SEXUAL RELATIONS: YES
CAN YOU DO HEAVY WORK AROUND THE HOUSE LIKE SCRUBBING FLOORS OR LIFTING AND MOVING HEAVY FURNITURE: YES
CAN YOU CLIMB A FLIGHT OF STAIRS OR WALK UP A HILL: YES
TOTAL_SCORE: 44.7
CAN YOU PARTICIPATE IN STRENOUS SPORTS LIKE SWIMMING, SINGLES TENNIS, FOOTBALL, BASKETBALL, OR SKIING: NO
CAN YOU WALK A BLOCK OR TWO ON LEVEL GROUND: YES
CAN YOU WALK INDOORS, SUCH AS AROUND YOUR HOUSE: YES
CAN YOU DO YARD WORK LIKE RAKING LEAVES, WEEDING OR PUSHING A MOWER: YES
DASI METS SCORE: 8.2
CAN YOU TAKE CARE OF YOURSELF (EAT, DRESS, BATHE, OR USE TOILET): YES

## 2024-01-03 ASSESSMENT — ENCOUNTER SYMPTOMS
TREMORS: 1
RESPIRATORY NEGATIVE: 1
ENDOCRINE NEGATIVE: 1
ROS GI COMMENTS: SEE HPI
NECK NEGATIVE: 1
MUSCULOSKELETAL NEGATIVE: 1
CONSTITUTIONAL NEGATIVE: 1
EYES NEGATIVE: 1

## 2024-01-03 ASSESSMENT — LIFESTYLE VARIABLES: SMOKING_STATUS: SMOKER

## 2024-01-03 ASSESSMENT — ACTIVITIES OF DAILY LIVING (ADL): ADL_SCORE: 0

## 2024-01-03 ASSESSMENT — CHADS2 SCORE
CHADS2 SCORE: 2
AGE GREATER THAN OR EQUAL TO 75: YES
PRIOR STROKE OR TIA OR THROMBOEMBOLISM: NO
CHF: YES
DIABETES: NO
HYPERTENSION: NO

## 2024-01-03 ASSESSMENT — PAIN - FUNCTIONAL ASSESSMENT: PAIN_FUNCTIONAL_ASSESSMENT: 0-10

## 2024-01-03 ASSESSMENT — PAIN SCALES - GENERAL: PAINLEVEL_OUTOF10: 0 - NO PAIN

## 2024-01-03 NOTE — CPM/PAT H&P
CPM/PAT Evaluation       Name: Ab Kendrick (Ab Kendrick)  /Age: 1945/78 y.o.     In-Person       Chief Complaint: inguinal hernia    HPI  This is an 79 yo with Pmhx of CAD, HF, [place], ICD/PM, and right inguinal hernia.  Pt denies pain or change in size of the hernia.  No change in bowel habits.  Denies recent fever or chills.    Past Medical History:   Diagnosis Date    CAD (coronary artery disease) 2023    Chronic combined systolic and diastolic heart failure, NYHA class 2 (CMS/HCC) 2023    Chronic rhinitis     Rhinitis    Diverticulosis of intestine, part unspecified, without perforation or abscess without bleeding     Diverticulosis    HTN (hypertension) 2023    Hyperlipidemia 2023    Myocardial infarction (CMS/[facility])     Persistent atrial fibrillation (CMS/[facility]) 2023       Past Surgical History:   Procedure Laterality Date    CARDIAC CATHETERIZATION  2017    Patent LAD and RCA stents.    CORONARY ANGIOPLASTY WITH STENT PLACEMENT  2010    PCI to LAD    CORONARY ANGIOPLASTY WITH STENT PLACEMENT  2011    PCI to RCA    OTHER SURGICAL HISTORY  10/19/2021    Hernia repair    OTHER SURGICAL HISTORY  10/19/2021    Complete colonoscopy    OTHER SURGICAL HISTORY  10/19/2021    Cardioverter defibrillator insertion    OTHER SURGICAL HISTORY  10/19/2021    Tonsillectomy    OTHER SURGICAL HISTORY  10/19/2021    Cataract surgery       Patient  reports being sexually active.    Family History   Problem Relation Name Age of Onset    Cancer Father      Prostate cancer Brother         No Known Allergies    Prior to Admission medications    Medication Sig Start Date End Date Taking? Authorizing Provider   biotin 1 mg tablet Take 1 tablet (1 mg) by mouth once daily.    Historical Provider, MD   chlorhexidine (Peridex) 0.12 % solution Sig: 15 mls swish and spit the night before surgery and 15mls swish and spit the morning of surgery do not swallow 1/3/24   Liudmila Kilpatrick  APRN-CNP   coenzyme Q-10 100 mg capsule Take 1 capsule (100 mg) by mouth once daily.    Historical Provider, MD   dabigatran etexilate (Pradaxa) 150 mg capsule Take 1 capsule (150 mg) by mouth 2 times a day.    Historical Provider, MD   dapagliflozin propanediol (Farxiga) 10 mg Take 1 tablet (10 mg) by mouth once daily.    Historical Provider, MD   furosemide (Lasix) 20 mg tablet Take 1 tablet (20 mg) by mouth once daily as needed (swelling). 10/26/21   Historical Provider, MD   glucosamine sulfate 1,000 mg capsule Take 2 capsules by mouth once daily.    Historical Provider, MD   metoprolol succinate XL (Toprol-XL) 25 mg 24 hr tablet Take 1 tablet (25 mg) by mouth once daily. 9/20/22   Historical Provider, MD   multivitamin tablet Take 1 tablet by mouth once daily. 3/29/21   Historical Provider, MD   rosuvastatin (Crestor) 20 mg tablet Take 1 tablet (20 mg) by mouth once daily. 10/3/16   Historical Provider, MD   sacubitriL-valsartan (Entresto)  mg tablet Take 1 tablet by mouth 2 times a day. 11/14/23 11/13/24  Rodriguez Clements MD   sildenafil (Viagra) 50 mg tablet Take 1 tablet (50 mg) by mouth once daily as needed for erectile dysfunction. 10/5/23 10/4/24  Rodriguez Clements MD   spironolactone (Aldactone) 25 mg tablet Take 0.5 tablets (12.5 mg) by mouth once daily.    Historical Provider, MD   vitamin A 2,400 mcg capsule Take 1 capsule (2.4 mg) by mouth once daily. 3/29/21   Historical Provider, MD   cyanocobalamin (Vitamin B-12) 1,000 mcg tablet Take 100 mcg by mouth once daily.  1/3/24  Historical Provider, MD   doxycycline (Vibra-Tabs) 100 mg tablet Take 1 tablet (100 mg) by mouth every 12 hours. 10/20/22 1/3/24  Historical Provider, MD   ferrous sulfate 325 (65 Fe) MG tablet Take 1 tablet by mouth once daily. With food  1/3/24  Historical Provider, MD        PAT ROS:   Constitutional:   neg    Neuro/Psych:    tremors (essential)  Eyes:   neg    Ears:    hearing loss   hearing aides  Nose:   neg    Mouth:    neg    Throat:   neg    Neck:   neg    Cardio:    Hx of V tach  has defibrillator and PM insitu  Hx of HF no recent hospital admission  Follows with Dr Clements  Respiratory:   neg    Endocrine:   neg    GI:    See HPI  :   neg    Musculoskeletal:   neg    Hematologic:   neg    Skin:  neg        Physical Exam  Constitutional:       Appearance: Normal appearance.   HENT:      Head: Normocephalic and atraumatic.      Nose: Nose normal.      Mouth/Throat:      Mouth: Mucous membranes are moist.   Eyes:      Pupils: Pupils are equal, round, and reactive to light.   Cardiovascular:      Rate and Rhythm: Normal rate and regular rhythm.   Pulmonary:      Effort: Pulmonary effort is normal.      Breath sounds: Normal breath sounds.   Abdominal:      General: Bowel sounds are normal.      Palpations: Abdomen is soft.   Musculoskeletal:         General: Normal range of motion.      Cervical back: Normal range of motion.   Skin:     General: Skin is warm and dry.   Neurological:      General: No focal deficit present.      Mental Status: He is alert and oriented to person, place, and time.   Psychiatric:         Mood and Affect: Mood normal.         Behavior: Behavior normal.        Airway full ROM  Teeth: dentures and partial  Anesthesia:  Patient denies any anesthesia complications.   ECG atrial paced rate of 64    Visit Vitals  /68   Pulse 64   Temp 36.5 °C (97.7 °F) (Temporal)   Resp 18       DASI Risk Score      Flowsheet Row Most Recent Value   DASI SCORE 44.7   METS Score (Will be calculated only when all the questions are answered) 8.2          Caprini DVT Assessment      Flowsheet Row Most Recent Value   DVT Score 9   Current Status Major surgery planned, including arthroscopic and laproscopic (1-2 hours)   History Congestive heart failure, Acute myocardial infarction, Prior major surgery   Age Over 75 years   BMI 30 or less          Modified Frailty Index      Flowsheet Row Most Recent Value   Modified  Frailty Index Calculator .2727          CHADS2 Stroke Risk  Current as of 9 minutes ago        5.9% 3 - 100%: High Risk   2 - 3%: Medium Risk   0 - 2%: Low Risk     Last Change:           This score determines the patient's risk of having a stroke if the patient has atrial fibrillation.          Points Metrics   1 Has Congestive Heart Failure:  Yes     Patients with congestive heart failure get 1 point.    Current as of 9 minutes ago   1 Has Hypertension:  Yes     Patients with hypertension get 1 point.    Current as of 9 minutes ago   1 Age:  78     Patients who are 75 years of age or older get 1 point.    Current as of 9 minutes ago   0 Has Diabetes:  No     Patients with diabetes get 1 point.    Current as of 9 minutes ago   0 Had Stroke:  No  Had TIA:  No  Had Thromboembolism:  No     Patients who have had a stroke, TIA, or thromboembolism get 2 points.    Current as of 9 minutes ago             Revised Cardiac Risk Index    No data to display       Apfel Simplified Score      Flowsheet Row Most Recent Value   Apfel Simplified Score Calculator 0          Risk Analysis Index Results This Encounter         1/3/2024  0832             COOL Cancer History: Patient does not indicate history of cancer    Total Risk Analysis Index Score Without Cancer: 34    Total Risk Analysis Index Score: 34          Stop Bang Score      Flowsheet Row Most Recent Value   Do you often feel tired or fatigued after your sleep? 0   Has anyone ever observed you stop breathing in your sleep? 0   Do you have or are you being treated for high blood pressure? 0   Is your neck circumference greater than 17 inches (Male) or 16 inches (Female)? 0            Assessment and Plan:     Plan for OR on 1/17 for repair of right inguinal with mesh  BMP CBC coags  MRSA  Request cardiac clearance Dr. Clements

## 2024-01-03 NOTE — PREPROCEDURE INSTRUCTIONS
Medication List            Accurate as of January 3, 2024  9:02 AM. Always use your most recent med list.                biotin 1 mg tablet  Medication Adjustments for Surgery: Stop 7 days before surgery     chlorhexidine 0.12 % solution  Commonly known as: Peridex  Sig: 15 mls swish and spit the night before surgery and 15mls swish and spit the morning of surgery do not swallow  Notes to patient: Take as instructed     coenzyme Q-10 100 mg capsule  Medication Adjustments for Surgery: Stop 7 days before surgery     dabigatran etexilate 150 mg capsule  Commonly known as: Pradaxa  Notes to patient: Discuss with ordering provider when to stop before surgery     dapagliflozin propanediol 10 mg  Commonly known as: Farxiga  Medication Adjustments for Surgery: Stop 3 days before surgery     Entresto  mg tablet  Generic drug: sacubitriL-valsartan  Take 1 tablet by mouth 2 times a day.  Medication Adjustments for Surgery: Stop 1 day before surgery     furosemide 20 mg tablet  Commonly known as: Lasix  Medication Adjustments for Surgery: Continue until night before surgery     glucosamine sulfate 1,000 mg capsule  Medication Adjustments for Surgery: Stop 7 days before surgery     metoprolol succinate XL 25 mg 24 hr tablet  Commonly known as: Toprol-XL  Medication Adjustments for Surgery: Take morning of surgery with sip of water, no other fluids     multivitamin tablet  Medication Adjustments for Surgery: Stop 7 days before surgery     rosuvastatin 20 mg tablet  Commonly known as: Crestor  Medication Adjustments for Surgery: Take morning of surgery with sip of water, no other fluids     sildenafil 50 mg tablet  Commonly known as: Viagra  Take 1 tablet (50 mg) by mouth once daily as needed for erectile dysfunction.  Medication Adjustments for Surgery: Stop 3 days before surgery     spironolactone 25 mg tablet  Commonly known as: Aldactone  Medication Adjustments for Surgery: Continue until night before surgery      vitamin A 2,400 mcg capsule  Medication Adjustments for Surgery: Stop 7 days before surgery            PRE-OPERATIVE INSTRUCTIONS    You will receive notification one business day prior to your surgery to confirm your arrival time and additional information. It is important that you answer your phone and/or check your messages during this time.    Please enter the building through the Outpatient entrance. Take the elevator off the lobby to the 2nd floor and check in at the Outpatient Surgery desk    INSTRUCTIONS:  Talk to your surgeon for instructions if you should stop your aspirin, blood thinner, or diabetes medicines.  DO NOT take any multivitamins or over the counter supplements for 7-10 days before surgery.  If not being admitted, you must have an adult immediately available to drive you home after surgery. We also highly recommend you have someone stay with you for the entire day and night of your surgery.  For children having surgery, a parent or legal guardian must accompany t hem to the surgery center. If this is not possible, please call [phone] to make additional arrangements.  For adults who are unable to consent or make medical decisions for themselves, a legal guardian or Power of  must accompany them to the surgery center. If this is not possible, please call [phone] to make additional arrangements.  Wear comfortable, loose fitting clothing.  All jewelry and piercings must be removed. If you are unable to remove an item or have a dermal piercing, please be sure to tell the nurse when you arrive for surgery.  Nail polish and make-up must be removed.  Avoid smoking or consuming alcohol for 24 hours before surgery.  To help prevent infection, please take a shower/bath and wash your hair the night before and/or morning of surgery.    Additional instructions about eating and drinking before surgery:  Do not eat any solid foods after midnight. Milk, nutritional drinks/supplements, and  infant formula are considered solid foods.  You may drink up to 12 oz. of clear liquids up to 2 hours before your arrival time for surgery, unless directed otherwise by your surgeon. Clear liquids include water, non-carbonated sports drinks (Gatorade), black tea or coffee (no creamers) and breast milk.    If you received a blue folder, please review additional information provided inside the folder regarding additional preparation.     If you have any questions or concerns, please call Pre-Admission Testing at [phone].

## 2024-01-05 LAB — STAPHYLOCOCCUS SPEC CULT: NORMAL

## 2024-01-06 LAB
ATRIAL RATE: 59 BPM
Q ONSET: 216 MS
QRS COUNT: 10 BEATS
QRS DURATION: 112 MS
QT INTERVAL: 412 MS
QTC CALCULATION(BAZETT): 425 MS
QTC FREDERICIA: 420 MS
R AXIS: 67 DEGREES
T AXIS: 97 DEGREES
T OFFSET: 422 MS
VENTRICULAR RATE: 64 BPM

## 2024-01-16 RX ORDER — ACETAMINOPHEN AND CODEINE PHOSPHATE 300; 30 MG/1; MG/1
1 TABLET ORAL EVERY 6 HOURS PRN
Qty: 15 TABLET | Refills: 0 | Status: SHIPPED | OUTPATIENT
Start: 2024-01-16 | End: 2024-04-01 | Stop reason: WASHOUT

## 2024-01-16 NOTE — H&P
Ab CABRERA [facility]   [id number]   1945         Chief Complaint/      Right inguinal hernia            HPI/    Patient is an 78-year-old male with a right inguinal hernia.  He presents now for elective repair      Past Medical History:   Diagnosis Date    CAD (coronary artery disease) 08/27/2023    Chronic combined systolic and diastolic heart failure, NYHA class 2 (CMS/Formerly [facility]) 08/27/2023    Chronic rhinitis     Rhinitis    Diverticulosis of intestine, part unspecified, without perforation or abscess without bleeding     Diverticulosis    HTN (hypertension) 08/27/2023    Hyperlipidemia 08/27/2023    Myocardial infarction (CMS/Formerly [facility])     Persistent atrial fibrillation (CMS/Formerly [facility]) 08/27/2023   Coronary artery disease     History of stents    Atrial fibrillation  Chronic anticoagulation    Hypertension    Elevated lipids    Denies diabetes or stroke    Status post repair of left inguinal hernia    Social History     Socioeconomic History    Marital status:      Spouse name: Not on file    Number of children: Not on file    Years of education: Not on file    Highest education level: Not on file   Occupational History    Not on file   Tobacco Use    Smoking status: Never    Smokeless tobacco: Never   Vaping Use    Vaping Use: Never used   Substance and Sexual Activity    Alcohol use: Yes     Alcohol/week: 8.0 standard drinks of alcohol     Types: 8 Cans of beer per week    Drug use: Never    Sexual activity: Yes   Other Topics Concern    Not on file   Social History Narrative    Not on file     Social Determinants of Health     Financial Resource Strain: Not on file   Food Insecurity: Not on file   Transportation Needs: Not on file   Physical Activity: Not on file   Stress: Not on file   Social Connections: Not on file   Intimate Partner Violence: Not on file   Housing Stability: Not on file      Non-smoker    Family History   Problem Relation Name Age of Onset    Cancer Father      Prostate cancer Brother           Review of Systems   All other systems reviewed and are negative.       No current facility-administered medications for this encounter.    Current Outpatient Medications:     biotin 1 mg tablet, Take 1 tablet (1 mg) by mouth once daily., Disp: , Rfl:     chlorhexidine (Peridex) 0.12 % solution, Sig: 15 mls swish and spit the night before surgery and 15mls swish and spit the morning of surgery do not swallow, Disp: 473 mL, Rfl: 0    coenzyme Q-10 100 mg capsule, Take 1 capsule (100 mg) by mouth once daily., Disp: , Rfl:     dabigatran etexilate (Pradaxa) 150 mg capsule, Take 1 capsule (150 mg) by mouth 2 times a day., Disp: , Rfl:     dapagliflozin propanediol (Farxiga) 10 mg, Take 1 tablet (10 mg) by mouth once daily., Disp: , Rfl:     furosemide (Lasix) 20 mg tablet, Take 1 tablet (20 mg) by mouth once daily as needed (swelling)., Disp: , Rfl:     glucosamine sulfate 1,000 mg capsule, Take 2 capsules by mouth once daily., Disp: , Rfl:     metoprolol succinate XL (Toprol-XL) 25 mg 24 hr tablet, Take 1 tablet (25 mg) by mouth once daily., Disp: , Rfl:     multivitamin tablet, Take 1 tablet by mouth once daily., Disp: , Rfl:     rosuvastatin (Crestor) 20 mg tablet, Take 1 tablet (20 mg) by mouth once daily., Disp: , Rfl:     sacubitriL-valsartan (Entresto)  mg tablet, Take 1 tablet by mouth 2 times a day., Disp: 180 tablet, Rfl: 3    sildenafil (Viagra) 50 mg tablet, Take 1 tablet (50 mg) by mouth once daily as needed for erectile dysfunction., Disp: 50 tablet, Rfl: 1    spironolactone (Aldactone) 25 mg tablet, Take 0.5 tablets (12.5 mg) by mouth once daily., Disp: , Rfl:     vitamin A 2,400 mcg capsule, Take 1 capsule (2.4 mg) by mouth once daily., Disp: , Rfl:      No Known Allergies     ECG 12 lead  Electronic atrial pacemaker  Low voltage QRS  Possible Septal infarct  Incomplete right bundle branch block  Abnormal ECG  When compared with ECG of 08-DEC-2023 11:40, (unconfirmed)  Nonspecific T wave  abnormality now evident in Anterior leads  Confirmed by Floyd Rob ([id number]) on 1/6/2024 8:21:17 PM       [unfilled]     There were no vitals taken for this visit.       Physical Exam  Constitutional:       Appearance: Normal appearance.   HENT:      Head: Normocephalic and atraumatic.   Cardiovascular:      Rate and Rhythm: Normal rate. Rhythm irregular.   Pulmonary:      Effort: Pulmonary effort is normal.      Breath sounds: Normal breath sounds.   Abdominal:      Comments: Hernia in right groin noted    Does not completely reduce    Left groin scar noted, no left inguinal hernia   Musculoskeletal:         General: Normal range of motion.      Cervical back: Normal range of motion.   Skin:     General: Skin is warm.   Neurological:      General: No focal deficit present.      Mental Status: He is alert and oriented to person, place, and time.                  Assessment/    Right inguinal hernia    Coronary artery disease  Atrial fibrillation  Chronic anticoagulation  Hypertension      Plan/    Cardiac clearance has been obtained.  Patient's anticoagulants have been held    Proceed to open repair under general anesthesia    Benefits indications for surgery reviewed with the patient.  The potential bleeding infection MI PE death etc. reviewed.  The anticipated convalescence is reviewed.  Use of mesh and prosthetic materials is reviewed.  All questions were answered and consent was obtained    Patient or stands he is at higher than average risk for bruising and possible hematoma given the anticoagulants.  He asked to proceed

## 2024-01-17 ENCOUNTER — HOSPITAL ENCOUNTER (OUTPATIENT)
Facility: HOSPITAL | Age: 79
Setting detail: OUTPATIENT SURGERY
Discharge: HOME | End: 2024-01-17
Attending: SURGERY | Admitting: SURGERY
Payer: MEDICARE

## 2024-01-17 ENCOUNTER — ANESTHESIA (OUTPATIENT)
Dept: OPERATING ROOM | Facility: HOSPITAL | Age: 79
End: 2024-01-17
Payer: MEDICARE

## 2024-01-17 ENCOUNTER — ANESTHESIA EVENT (OUTPATIENT)
Dept: OPERATING ROOM | Facility: HOSPITAL | Age: 79
End: 2024-01-17
Payer: MEDICARE

## 2024-01-17 VITALS
WEIGHT: 159 LBS | SYSTOLIC BLOOD PRESSURE: 122 MMHG | HEART RATE: 74 BPM | TEMPERATURE: 97.3 F | OXYGEN SATURATION: 98 % | DIASTOLIC BLOOD PRESSURE: 67 MMHG | RESPIRATION RATE: 17 BRPM | BODY MASS INDEX: 23.55 KG/M2 | HEIGHT: 69 IN

## 2024-01-17 DIAGNOSIS — K40.90 NON-RECURRENT UNILATERAL INGUINAL HERNIA WITHOUT OBSTRUCTION OR GANGRENE: Primary | ICD-10-CM

## 2024-01-17 PROCEDURE — A49505 PR REPAIR ING HERNIA,5+Y/O,REDUCIBL: Performed by: ANESTHESIOLOGY

## 2024-01-17 PROCEDURE — 88302 TISSUE EXAM BY PATHOLOGIST: CPT | Performed by: PATHOLOGY

## 2024-01-17 PROCEDURE — 2500000001 HC RX 250 WO HCPCS SELF ADMINISTERED DRUGS (ALT 637 FOR MEDICARE OP): Performed by: ANESTHESIOLOGY

## 2024-01-17 PROCEDURE — 3600000008 HC OR TIME - EACH INCREMENTAL 1 MINUTE - PROCEDURE LEVEL THREE: Performed by: SURGERY

## 2024-01-17 PROCEDURE — 2500000004 HC RX 250 GENERAL PHARMACY W/ HCPCS (ALT 636 FOR OP/ED): Performed by: SURGERY

## 2024-01-17 PROCEDURE — 7100000010 HC PHASE TWO TIME - EACH INCREMENTAL 1 MINUTE: Performed by: SURGERY

## 2024-01-17 PROCEDURE — 7100000001 HC RECOVERY ROOM TIME - INITIAL BASE CHARGE: Performed by: SURGERY

## 2024-01-17 PROCEDURE — C1781 MESH (IMPLANTABLE): HCPCS | Performed by: SURGERY

## 2024-01-17 PROCEDURE — 2500000004 HC RX 250 GENERAL PHARMACY W/ HCPCS (ALT 636 FOR OP/ED): Performed by: ANESTHESIOLOGY

## 2024-01-17 PROCEDURE — A49505 PR REPAIR ING HERNIA,5+Y/O,REDUCIBL: Performed by: NURSE ANESTHETIST, CERTIFIED REGISTERED

## 2024-01-17 PROCEDURE — 2720000007 HC OR 272 NO HCPCS: Performed by: SURGERY

## 2024-01-17 PROCEDURE — 2500000004 HC RX 250 GENERAL PHARMACY W/ HCPCS (ALT 636 FOR OP/ED): Performed by: NURSE ANESTHETIST, CERTIFIED REGISTERED

## 2024-01-17 PROCEDURE — 49521 REREPAIR ING HERNIA BLOCKED: CPT | Performed by: SURGERY

## 2024-01-17 PROCEDURE — 3700000001 HC GENERAL ANESTHESIA TIME - INITIAL BASE CHARGE: Performed by: SURGERY

## 2024-01-17 PROCEDURE — 7100000009 HC PHASE TWO TIME - INITIAL BASE CHARGE: Performed by: SURGERY

## 2024-01-17 PROCEDURE — 3700000002 HC GENERAL ANESTHESIA TIME - EACH INCREMENTAL 1 MINUTE: Performed by: SURGERY

## 2024-01-17 PROCEDURE — 99100 ANES PT EXTEME AGE<1 YR&>70: CPT | Performed by: ANESTHESIOLOGY

## 2024-01-17 PROCEDURE — 3600000003 HC OR TIME - INITIAL BASE CHARGE - PROCEDURE LEVEL THREE: Performed by: SURGERY

## 2024-01-17 PROCEDURE — 2780000003 HC OR 278 NO HCPCS: Performed by: SURGERY

## 2024-01-17 PROCEDURE — 2500000005 HC RX 250 GENERAL PHARMACY W/O HCPCS: Performed by: NURSE ANESTHETIST, CERTIFIED REGISTERED

## 2024-01-17 PROCEDURE — 7100000002 HC RECOVERY ROOM TIME - EACH INCREMENTAL 1 MINUTE: Performed by: SURGERY

## 2024-01-17 PROCEDURE — 88302 TISSUE EXAM BY PATHOLOGIST: CPT | Mod: TC,SUR,STJLAB | Performed by: SURGERY

## 2024-01-17 DEVICE — BARD MESH
Type: IMPLANTABLE DEVICE | Site: INGUINAL | Status: FUNCTIONAL
Brand: BARD MESH

## 2024-01-17 RX ORDER — LIDOCAINE HYDROCHLORIDE 10 MG/ML
0.1 INJECTION, SOLUTION EPIDURAL; INFILTRATION; INTRACAUDAL; PERINEURAL ONCE
Status: DISCONTINUED | OUTPATIENT
Start: 2024-01-17 | End: 2024-01-17 | Stop reason: HOSPADM

## 2024-01-17 RX ORDER — ROCURONIUM BROMIDE 50 MG/5 ML
SYRINGE (ML) INTRAVENOUS AS NEEDED
Status: DISCONTINUED | OUTPATIENT
Start: 2024-01-17 | End: 2024-01-17

## 2024-01-17 RX ORDER — LIDOCAINE HYDROCHLORIDE 20 MG/ML
INJECTION, SOLUTION EPIDURAL; INFILTRATION; INTRACAUDAL; PERINEURAL AS NEEDED
Status: DISCONTINUED | OUTPATIENT
Start: 2024-01-17 | End: 2024-01-17

## 2024-01-17 RX ORDER — ONDANSETRON HYDROCHLORIDE 2 MG/ML
4 INJECTION, SOLUTION INTRAVENOUS ONCE AS NEEDED
Status: DISCONTINUED | OUTPATIENT
Start: 2024-01-17 | End: 2024-01-17 | Stop reason: HOSPADM

## 2024-01-17 RX ORDER — DIPHENHYDRAMINE HYDROCHLORIDE 50 MG/ML
12.5 INJECTION INTRAMUSCULAR; INTRAVENOUS ONCE AS NEEDED
Status: DISCONTINUED | OUTPATIENT
Start: 2024-01-17 | End: 2024-01-17 | Stop reason: HOSPADM

## 2024-01-17 RX ORDER — HYDROMORPHONE HYDROCHLORIDE 1 MG/ML
0.2 INJECTION, SOLUTION INTRAMUSCULAR; INTRAVENOUS; SUBCUTANEOUS EVERY 5 MIN PRN
Status: DISCONTINUED | OUTPATIENT
Start: 2024-01-17 | End: 2024-01-17 | Stop reason: HOSPADM

## 2024-01-17 RX ORDER — LABETALOL HYDROCHLORIDE 5 MG/ML
5 INJECTION, SOLUTION INTRAVENOUS ONCE AS NEEDED
Status: DISCONTINUED | OUTPATIENT
Start: 2024-01-17 | End: 2024-01-17 | Stop reason: HOSPADM

## 2024-01-17 RX ORDER — ONDANSETRON HYDROCHLORIDE 2 MG/ML
INJECTION, SOLUTION INTRAVENOUS AS NEEDED
Status: DISCONTINUED | OUTPATIENT
Start: 2024-01-17 | End: 2024-01-17

## 2024-01-17 RX ORDER — HYDROMORPHONE HYDROCHLORIDE 1 MG/ML
0.5 INJECTION, SOLUTION INTRAMUSCULAR; INTRAVENOUS; SUBCUTANEOUS EVERY 5 MIN PRN
Status: DISCONTINUED | OUTPATIENT
Start: 2024-01-17 | End: 2024-01-17 | Stop reason: HOSPADM

## 2024-01-17 RX ORDER — PHENYLEPHRINE HCL IN 0.9% NACL 1 MG/10 ML
SYRINGE (ML) INTRAVENOUS AS NEEDED
Status: DISCONTINUED | OUTPATIENT
Start: 2024-01-17 | End: 2024-01-17

## 2024-01-17 RX ORDER — HYDROMORPHONE HYDROCHLORIDE 1 MG/ML
INJECTION, SOLUTION INTRAMUSCULAR; INTRAVENOUS; SUBCUTANEOUS AS NEEDED
Status: DISCONTINUED | OUTPATIENT
Start: 2024-01-17 | End: 2024-01-17

## 2024-01-17 RX ORDER — DEXAMETHASONE SODIUM PHOSPHATE 4 MG/ML
INJECTION, SOLUTION INTRA-ARTICULAR; INTRALESIONAL; INTRAMUSCULAR; INTRAVENOUS; SOFT TISSUE AS NEEDED
Status: DISCONTINUED | OUTPATIENT
Start: 2024-01-17 | End: 2024-01-17

## 2024-01-17 RX ORDER — SODIUM CHLORIDE, SODIUM LACTATE, POTASSIUM CHLORIDE, CALCIUM CHLORIDE 600; 310; 30; 20 MG/100ML; MG/100ML; MG/100ML; MG/100ML
INJECTION, SOLUTION INTRAVENOUS CONTINUOUS PRN
Status: DISCONTINUED | OUTPATIENT
Start: 2024-01-17 | End: 2024-01-17

## 2024-01-17 RX ORDER — SODIUM CHLORIDE, SODIUM LACTATE, POTASSIUM CHLORIDE, CALCIUM CHLORIDE 600; 310; 30; 20 MG/100ML; MG/100ML; MG/100ML; MG/100ML
100 INJECTION, SOLUTION INTRAVENOUS CONTINUOUS
Status: DISCONTINUED | OUTPATIENT
Start: 2024-01-17 | End: 2024-01-17 | Stop reason: HOSPADM

## 2024-01-17 RX ORDER — NEOSTIGMINE METHYLSULFATE 1 MG/ML
INJECTION, SOLUTION INTRAVENOUS AS NEEDED
Status: DISCONTINUED | OUTPATIENT
Start: 2024-01-17 | End: 2024-01-17

## 2024-01-17 RX ORDER — GLYCOPYRROLATE 0.2 MG/ML
INJECTION INTRAMUSCULAR; INTRAVENOUS AS NEEDED
Status: DISCONTINUED | OUTPATIENT
Start: 2024-01-17 | End: 2024-01-17

## 2024-01-17 RX ORDER — OXYCODONE AND ACETAMINOPHEN 5; 325 MG/1; MG/1
1 TABLET ORAL EVERY 4 HOURS PRN
Status: DISCONTINUED | OUTPATIENT
Start: 2024-01-17 | End: 2024-01-17 | Stop reason: HOSPADM

## 2024-01-17 RX ORDER — ALBUTEROL SULFATE 0.83 MG/ML
2.5 SOLUTION RESPIRATORY (INHALATION) ONCE AS NEEDED
Status: DISCONTINUED | OUTPATIENT
Start: 2024-01-17 | End: 2024-01-17 | Stop reason: HOSPADM

## 2024-01-17 RX ORDER — HYDRALAZINE HYDROCHLORIDE 20 MG/ML
5 INJECTION INTRAMUSCULAR; INTRAVENOUS EVERY 30 MIN PRN
Status: DISCONTINUED | OUTPATIENT
Start: 2024-01-17 | End: 2024-01-17 | Stop reason: HOSPADM

## 2024-01-17 RX ORDER — FENTANYL CITRATE 50 UG/ML
INJECTION, SOLUTION INTRAMUSCULAR; INTRAVENOUS AS NEEDED
Status: DISCONTINUED | OUTPATIENT
Start: 2024-01-17 | End: 2024-01-17

## 2024-01-17 RX ORDER — NORETHINDRONE AND ETHINYL ESTRADIOL 0.5-0.035
KIT ORAL AS NEEDED
Status: DISCONTINUED | OUTPATIENT
Start: 2024-01-17 | End: 2024-01-17

## 2024-01-17 RX ORDER — CEFAZOLIN SODIUM 2 G/100ML
2 INJECTION, SOLUTION INTRAVENOUS
Status: COMPLETED | OUTPATIENT
Start: 2024-01-17 | End: 2024-01-17

## 2024-01-17 RX ORDER — ACETAMINOPHEN 325 MG/1
975 TABLET ORAL ONCE
Status: DISCONTINUED | OUTPATIENT
Start: 2024-01-17 | End: 2024-01-17 | Stop reason: HOSPADM

## 2024-01-17 RX ORDER — MIDAZOLAM HYDROCHLORIDE 1 MG/ML
INJECTION, SOLUTION INTRAMUSCULAR; INTRAVENOUS CONTINUOUS PRN
Status: DISCONTINUED | OUTPATIENT
Start: 2024-01-17 | End: 2024-01-17

## 2024-01-17 RX ORDER — OXYCODONE HYDROCHLORIDE 5 MG/1
5 TABLET ORAL EVERY 4 HOURS PRN
Status: DISCONTINUED | OUTPATIENT
Start: 2024-01-17 | End: 2024-01-17 | Stop reason: HOSPADM

## 2024-01-17 RX ORDER — PROPOFOL 10 MG/ML
INJECTION, EMULSION INTRAVENOUS AS NEEDED
Status: DISCONTINUED | OUTPATIENT
Start: 2024-01-17 | End: 2024-01-17

## 2024-01-17 RX ADMIN — FENTANYL CITRATE 50 MCG: 50 INJECTION, SOLUTION INTRAMUSCULAR; INTRAVENOUS at 08:49

## 2024-01-17 RX ADMIN — OXYCODONE HYDROCHLORIDE AND ACETAMINOPHEN 1 TABLET: 5; 325 TABLET ORAL at 11:47

## 2024-01-17 RX ADMIN — GLYCOPYRROLATE 0.6 MG: 0.2 INJECTION, SOLUTION INTRAMUSCULAR; INTRAVENOUS at 10:23

## 2024-01-17 RX ADMIN — FENTANYL CITRATE 50 MCG: 50 INJECTION, SOLUTION INTRAMUSCULAR; INTRAVENOUS at 09:03

## 2024-01-17 RX ADMIN — ONDANSETRON 4 MG: 2 INJECTION, SOLUTION INTRAMUSCULAR; INTRAVENOUS at 09:02

## 2024-01-17 RX ADMIN — PROPOFOL 120 MG: 10 INJECTION, EMULSION INTRAVENOUS at 08:50

## 2024-01-17 RX ADMIN — HYDROMORPHONE HYDROCHLORIDE 0.5 MG: 1 INJECTION, SOLUTION INTRAMUSCULAR; INTRAVENOUS; SUBCUTANEOUS at 09:33

## 2024-01-17 RX ADMIN — EPHEDRINE SULFATE 10 MG: 50 INJECTION, SOLUTION INTRAVENOUS at 09:24

## 2024-01-17 RX ADMIN — LIDOCAINE HYDROCHLORIDE 100 MG: 20 INJECTION, SOLUTION EPIDURAL; INFILTRATION; INTRACAUDAL; PERINEURAL at 08:50

## 2024-01-17 RX ADMIN — DEXAMETHASONE SODIUM PHOSPHATE 4 MG: 4 INJECTION INTRA-ARTICULAR; INTRALESIONAL; INTRAMUSCULAR; INTRAVENOUS; SOFT TISSUE at 09:02

## 2024-01-17 RX ADMIN — Medication 100 MCG: at 09:13

## 2024-01-17 RX ADMIN — HYDROMORPHONE HYDROCHLORIDE 0.5 MG: 1 INJECTION, SOLUTION INTRAMUSCULAR; INTRAVENOUS; SUBCUTANEOUS at 10:37

## 2024-01-17 RX ADMIN — CEFAZOLIN SODIUM 2 G: 2 INJECTION, SOLUTION INTRAVENOUS at 09:01

## 2024-01-17 RX ADMIN — Medication 100 MCG: at 09:19

## 2024-01-17 RX ADMIN — HYDROMORPHONE HYDROCHLORIDE 0.5 MG: 1 INJECTION, SOLUTION INTRAMUSCULAR; INTRAVENOUS; SUBCUTANEOUS at 10:23

## 2024-01-17 RX ADMIN — Medication 50 MG: at 08:50

## 2024-01-17 RX ADMIN — NEOSTIGMINE METHYLSULFATE 4 MG: 1 INJECTION INTRAVENOUS at 10:23

## 2024-01-17 RX ADMIN — Medication 100 MCG: at 09:18

## 2024-01-17 RX ADMIN — SODIUM CHLORIDE, POTASSIUM CHLORIDE, SODIUM LACTATE AND CALCIUM CHLORIDE: 600; 310; 30; 20 INJECTION, SOLUTION INTRAVENOUS at 08:43

## 2024-01-17 RX ADMIN — Medication 20 MG: at 09:32

## 2024-01-17 RX ADMIN — GLYCOPYRROLATE 0.2 MG: 0.2 INJECTION, SOLUTION INTRAMUSCULAR; INTRAVENOUS at 09:15

## 2024-01-17 RX ADMIN — OXYCODONE HYDROCHLORIDE AND ACETAMINOPHEN 1 TABLET: 5; 325 TABLET ORAL at 16:42

## 2024-01-17 SDOH — HEALTH STABILITY: MENTAL HEALTH: CURRENT SMOKER: 0

## 2024-01-17 ASSESSMENT — PAIN SCALES - GENERAL
PAINLEVEL_OUTOF10: 5 - MODERATE PAIN
PAINLEVEL_OUTOF10: 5 - MODERATE PAIN
PAINLEVEL_OUTOF10: 0 - NO PAIN
PAINLEVEL_OUTOF10: 2
PAINLEVEL_OUTOF10: 0 - NO PAIN
PAINLEVEL_OUTOF10: 7
PAINLEVEL_OUTOF10: 7
PAINLEVEL_OUTOF10: 5 - MODERATE PAIN
PAINLEVEL_OUTOF10: 5 - MODERATE PAIN
PAINLEVEL_OUTOF10: 6

## 2024-01-17 ASSESSMENT — PAIN DESCRIPTION - ORIENTATION: ORIENTATION: RIGHT

## 2024-01-17 ASSESSMENT — PAIN - FUNCTIONAL ASSESSMENT
PAIN_FUNCTIONAL_ASSESSMENT: 0-10

## 2024-01-17 ASSESSMENT — COLUMBIA-SUICIDE SEVERITY RATING SCALE - C-SSRS
2. HAVE YOU ACTUALLY HAD ANY THOUGHTS OF KILLING YOURSELF?: NO
6. HAVE YOU EVER DONE ANYTHING, STARTED TO DO ANYTHING, OR PREPARED TO DO ANYTHING TO END YOUR LIFE?: NO
1. IN THE PAST MONTH, HAVE YOU WISHED YOU WERE DEAD OR WISHED YOU COULD GO TO SLEEP AND NOT WAKE UP?: NO

## 2024-01-17 ASSESSMENT — PAIN DESCRIPTION - LOCATION: LOCATION: ABDOMEN

## 2024-01-17 NOTE — BRIEF OP NOTE
Date: 2024  OR Location: [facility] [[email or web address] OR    Name: bA Kendrick, : 1945, Age: 78 y.o., MRN: [id number], Sex: male    Diagnosis  Pre-op Diagnosis     * Non-recurrent unilateral inguinal hernia without obstruction or gangrene [K40.90] Post-op Diagnosis     * Non-recurrent unilateral inguinal hernia without obstruction or gangrene [K40.90]     Procedures  Repair of right inguinal hernia with mesh  14396 - [place] RPR 1ST INGUN HRNA AGE 5 YRS/> REDUCIBLE      Surgeons      * Floyd Fu - Primary    Resident/Fellow/Other Assistant:  Surgeon(s) and Role:    Procedure Summary  Anesthesia: General  ASA: III  Anesthesia Staff: Anesthesiologist: Edgar Colon MD  CRNA: TANIA Cristobal-CRNA  Estimated Blood Loss: Minimal mL  Intra-op Medications:   Medication Name Total Dose   ceFAZolin in dextrose (iso-os) (Ancef) IVPB 2 g 2 g              Anesthesia Record               Intraprocedure I/O Totals          Intake    ceFAZolin in dextrose (iso-os) (Ancef) IVPB 2 g 100.00 mL    Total Intake 100 mL          Specimen:   ID Type Source Tests Collected by Time   1 : RIGHT HERNIA TISSUE Tissue HERNIA SAC SURGICAL PATHOLOGY EXAM Floyd Fu MD 2024 3637        Staff:   Circulator: Danitza Mena RN  Scrub Person: Dede Martines          Findings: Recurrent direct, chronically incarcerated    Complications:  None; patient tolerated the procedure well.     Disposition: PACU - hemodynamically stable.  Condition: stable  Specimens Collected:   ID Type Source Tests Collected by Time   1 : RIGHT HERNIA TISSUE Tissue HERNIA SAC SURGICAL PATHOLOGY EXAM Floyd Fu MD 2024 2579     Attending Attestation: I performed the procedure.    Floyd Fu  Phone Number: [phone]

## 2024-01-17 NOTE — OP NOTE
Repair of right inguinal hernia with mesh (R) Operative Note     Date: 2024  OR Location: [facility] OR    Name: Ab Kendrick, : 1945, Age: 78 y.o., MRN: [id number], Sex: male    Diagnosis  Pre-op Diagnosis     * Non-recurrent unilateral inguinal hernia without obstruction or gangrene [K40.90] Post-op Diagnosis     * Non-recurrent unilateral inguinal hernia without obstruction or gangrene [K40.90]     Procedures  Repair of right inguinal hernia with mesh  97545 - [place] RPR 1ST INGUN HRNA AGE 5 YRS/> REDUCIBLE      Surgeons      * Floyd Fu - Primary    Resident/Fellow/Other Assistant:  Surgeon(s) and Role:    Procedure Summary  Anesthesia: General  ASA: III  Anesthesia Staff: Anesthesiologist: Edgar Colon MD  CRNA: TANIA Cristobal-CRNA  Estimated Blood Loss: Minimal mL  Intra-op Medications:   Medication Name Total Dose   ceFAZolin in dextrose (iso-os) (Ancef) IVPB 2 g 2 g              Anesthesia Record               Intraprocedure I/O Totals          Intake    ceFAZolin in dextrose (iso-os) (Ancef) IVPB 2 g 100.00 mL    Total Intake 100 mL          Specimen:   ID Type Source Tests Collected by Time   1 : RIGHT HERNIA TISSUE Tissue HERNIA SAC SURGICAL PATHOLOGY EXAM Floyd Fu MD 2024 0948        Staff:   Circulator: Danitza Mena RN  Scrub Person: Dede Martines         Drains and/or Catheters: * None in log *    Tourniquet Times:    Patient is an 78-year-old male with a chronically incarcerated recurrent right inguinal hernia.  He presents now for repair.  Risk benefits indications was reviewed.  The anticipated convalescence is reviewed.  Use of mesh prostatic materials is reviewed.  All questions were answered and consent obtained    Patient was taken to the operating room placed on table in supine position.  General esthesia was obtained.  External genitalia, upper thighs and abdomen were prepped and draped in a sterile fashion.  Prophylactic biotics were given.  Timeout  procedures were followed.    DVT prophylaxis obtained using external pneumatic compression stockings    .  Chemoprophylaxis not utilized as the patient was coming off anticoagulants.    Right inguinal incision made.  Incision was carried laterally at the entry through virgin tissue.  External oblique was identified and cleared on its anterior aspect.  Again it was opened laterally through virgin area.  The external Bleich was carefully and removed and the canal exposed.  Old piece of prosthetic mesh was encountered at the level of the internal ring.  Cord was densely adherent to this in the surrounding tissues.  This was carefully dissected free and controlled with a Penrose drain.    The hernial mass was encountered in the upper scrotum and followed up towards the L4.  There was a direct defect immediately at the pubic tubercle.    After  the cord from hernia the cord was explored.  There was no lipoma or indirect component.    The hernia was entirely direct in character extended into the upper scrotum.  Again this was freed up to the level of the fascia fascia.  The sac had several translucent area and 1 can see omentum through this.  An area that this was now opened and the sac entered there was chronically incarcerated omentum within this.  The some of the omentum was excised and the omental stump reduced back into the abdomen    After reducing the omentum back into the abdomen the floor was further palpated and there were no other defects    Again this was a direct defect very medially at the medial edge of the prior placed piece of mesh    The rest the sac was now explored.  Most of this appeared to be thin peritoneal tissue over the medial aspect appeared to be sliding bladder.  A running 2-0 Prolene stitch was placed along the edge of this and the origins of the sac and then secured this reduced back into the abdomen    Cord was again inspected noted to be intact    A 3 x 6 inch piece of flat  polypropylene mesh was brought onto the field.  The posterior floor was already widely open and Ralph's ligament directly exposed.  Mesh was anchored to the pubic tubercle and the Ralph's ligament using multiple interrupted 0 Prolene sutures.  Multiple interrupted 0 Prolene stitches were also used anchor the mesh medial to the lateral edge of the conjoined tendon.    After this was accomplished running double-armed 2-0 Prolene was brought onto the field.  1 arm of the 2-0 Prolene was used to anchor the mesh in a running fashion to the lateral edge of the conjoined tendon and the superior margin of the transversalis arch.  The second arm of the 2-0 Prolene was used to anchor the mesh in a running fashion to the iliopubic tract and shelving edge of the ligament.  Laterally a transverse slit was created the mesh so as to allow passage of the cord.  The 2 arms were anchored to each with a laterally        Inspected hemostasis was good counts reported as correct    Findings were pertinent for old mesh.  This appear to be a plug and patch type of repair where the internal ring was well-fixed over the floor was not addressed.  Again there was a significant direct defect with chronically incarcerated omentum.  Again this reduced back into the abdomen in a classic open Jose repair of mesh carried out    Hemostasis was good counts reported as correct external was closed with running 2-0 Vicryl.  Skin was closed with staples patient tolerated procedure well    IAttending Attestation: I performed the procedure.    Floyd Fu  Phone Number: [phone]

## 2024-01-17 NOTE — ANESTHESIA PROCEDURE NOTES
Airway  Date/Time: 1/17/2024 8:52 AM  Urgency: elective      Staffing  Performed: resident   Authorized by: Edgar Colon MD    Performed by: TANIA Cristobal-CRNA  Patient location during procedure: OR    Indications and Patient Condition  Indications for airway management: anesthesia  Spontaneous ventilation: present  Sedation level: deep  Preoxygenated: yes  Patient position: sniffing  Mask difficulty assessment: 2 - vent by mask + OA or adjuvant +/- NMBA    Final Airway Details  Final airway type: endotracheal airway      Successful airway: ETT     Successful intubation technique: direct laryngoscopy  Blade: Meaghan  Blade size: #3  ETT size (mm): 7.5  Cormack-Lehane Classification: grade IIa - partial view of glottis  Placement verified by: capnometry   Measured from: gums  ETT to gums (cm): 22  Number of attempts at approach: 1    Additional Comments  Resident performed intubation under supervision of Dr. Colon. Lips/teeth in preanesthetic condition.

## 2024-01-17 NOTE — ANESTHESIA PREPROCEDURE EVALUATION
Patient: Ab Kendrick    Procedure Information       Date/Time: 01/17/24 0900    Procedure: Repair of right inguinal hernia with mesh (Right)    Location: [facility] OR 10 / [facility] OR    Surgeons: Floyd Fu MD            Relevant Problems   Cardiovascular   (+) Angina pectoris (CMS/HCC)   (+) Atrial flutter (CMS/HCC)   (+) Atrial tachycardia, paroxysmal   (+) CAD (coronary artery disease)   (+) Chronic combined systolic and diastolic heart failure, NYHA class 2 (CMS/HCC)   (+) Familial hypercholesteremia   (+) HTN (hypertension)   (+) Hyperlipidemia   (+) PAC (premature atrial contraction)   (+) PVC (premature ventricular contraction)   (+) Paroxysmal atrial fibrillation (CMS/HCC)   (+) Persistent atrial fibrillation (CMS/HCC)   (+) Systolic heart failure (CMS/HCC)      Endocrine   (+) Hyponatremia      GI   (+) Chronic diarrhea      Pulmonary   (+) COPD (chronic obstructive pulmonary disease) (CMS/HCC)      Hematology   (+) Anemia       Clinical information reviewed:   Tobacco  Allergies  Meds   Med Hx  Surg Hx   Fam Hx  Soc Hx        NPO Detail:  NPO/Void Status  Carbohydrate Drink Given Prior to Surgery? : N  Date of Last Liquid: 01/17/24  Time of Last Liquid: 0600  Date of Last Solid: 01/16/24  Time of Last Solid: 2200  Last Intake Type: Clear fluids  Time of Last Void: 0756         Physical Exam    Airway  Mallampati: II  TM distance: >3 FB  Neck ROM: full     Cardiovascular   Rhythm: regular  Rate: normal     Dental - normal exam     Pulmonary   Breath sounds clear to auscultation     Abdominal            Anesthesia Plan    History of general anesthesia?: yes  History of complications of general anesthesia?: no    ASA 3     general     The patient is not a current smoker.    intravenous induction   Anesthetic plan and risks discussed with patient.    Plan discussed with CRNA.

## 2024-01-17 NOTE — ANESTHESIA POSTPROCEDURE EVALUATION
Patient: Ab Kendrick    Procedure Summary       Date: 01/17/24 Room / Location: [facility] OR  / [facility] OR    Anesthesia Start: 0846 Anesthesia Stop: 1032    Procedure: Repair of right inguinal hernia with mesh (Right: Abdomen) Diagnosis:       Non-recurrent unilateral inguinal hernia without obstruction or gangrene      (Non-recurrent unilateral inguinal hernia without obstruction or gangrene [K40.90])    Surgeons: Floyd Fu MD Responsible Provider: Edgar Colon MD    Anesthesia Type: general ASA Status: 3            Anesthesia Type: general    Vitals Value Taken Time   /80 01/17/24 1045   Temp 36.4 °C (97.5 °F) 01/17/24 1030   Pulse 78 01/17/24 1057   Resp 18 01/17/24 1057   SpO2 91 % 01/17/24 1057   Vitals shown include unvalidated device data.    Anesthesia Post Evaluation    Patient location during evaluation: PACU  Patient participation: complete - patient participated  Level of consciousness: awake and alert  Pain management: satisfactory to patient  Multimodal analgesia pain management approach  Airway patency: patent  Cardiovascular status: acceptable  Respiratory status: acceptable  Hydration status: euvolemic  Postoperative Nausea and Vomiting: none        No notable events documented.

## 2024-01-19 DIAGNOSIS — I25.10 CORONARY ARTERY DISEASE, UNSPECIFIED VESSEL OR LESION TYPE, UNSPECIFIED WHETHER ANGINA PRESENT, UNSPECIFIED WHETHER NATIVE OR TRANSPLANTED HEART: ICD-10-CM

## 2024-01-19 LAB
LABORATORY COMMENT REPORT: NORMAL
PATH REPORT.FINAL DX SPEC: NORMAL
PATH REPORT.GROSS SPEC: NORMAL
PATH REPORT.RELEVANT HX SPEC: NORMAL
PATH REPORT.TOTAL CANCER: NORMAL

## 2024-01-19 RX ORDER — DABIGATRAN ETEXILATE 150 MG/1
150 CAPSULE ORAL 2 TIMES DAILY
Qty: 90 CAPSULE | Refills: 3 | Status: SHIPPED | OUTPATIENT
Start: 2024-01-19 | End: 2024-04-08 | Stop reason: SDUPTHER

## 2024-01-19 RX ORDER — DAPAGLIFLOZIN 10 MG/1
10 TABLET, FILM COATED ORAL DAILY
Qty: 90 TABLET | Refills: 3 | Status: SHIPPED | OUTPATIENT
Start: 2024-01-19 | End: 2024-04-08 | Stop reason: SDUPTHER

## 2024-01-25 LAB
ATRIAL RATE: 75 BPM
P AXIS: 74 DEGREES
PR INTERVAL: 290 MS
Q ONSET: 216 MS
QRS COUNT: 13 BEATS
QRS DURATION: 112 MS
QT INTERVAL: 388 MS
QTC CALCULATION(BAZETT): 433 MS
QTC FREDERICIA: 417 MS
R AXIS: 70 DEGREES
T AXIS: 57 DEGREES
T OFFSET: 410 MS
VENTRICULAR RATE: 75 BPM

## 2024-01-30 ENCOUNTER — OFFICE VISIT (OUTPATIENT)
Dept: SURGERY | Facility: CLINIC | Age: 79
End: 2024-01-30
Payer: MEDICARE

## 2024-01-30 VITALS
RESPIRATION RATE: 16 BRPM | SYSTOLIC BLOOD PRESSURE: 126 MMHG | TEMPERATURE: 97.9 F | HEART RATE: 74 BPM | OXYGEN SATURATION: 99 % | DIASTOLIC BLOOD PRESSURE: 76 MMHG

## 2024-01-30 DIAGNOSIS — K40.90 NON-RECURRENT UNILATERAL INGUINAL HERNIA WITHOUT OBSTRUCTION OR GANGRENE: Primary | ICD-10-CM

## 2024-01-30 PROCEDURE — 1036F TOBACCO NON-USER: CPT | Performed by: SURGERY

## 2024-01-30 PROCEDURE — 1159F MED LIST DOCD IN RCRD: CPT | Performed by: SURGERY

## 2024-01-30 PROCEDURE — 1126F AMNT PAIN NOTED NONE PRSNT: CPT | Performed by: SURGERY

## 2024-01-30 PROCEDURE — 3078F DIAST BP <80 MM HG: CPT | Performed by: SURGERY

## 2024-01-30 PROCEDURE — 99024 POSTOP FOLLOW-UP VISIT: CPT | Performed by: SURGERY

## 2024-01-30 PROCEDURE — 3074F SYST BP LT 130 MM HG: CPT | Performed by: SURGERY

## 2024-01-30 NOTE — PROGRESS NOTES
Chief complaint/    Postop check        HPI/    78-year-old male status post repair of right inguinal hernia on 1/17/2024.    Patient convalescing uneventfully.  Minimal discomfort.  Just taking as needed Tylenol.  GI and  function at baseline    Ambulating easily.  Wants to go back to gym        Physical exam/    The wound is clean, dry, soft.  No redness or drainage    No significant swelling        Assessment/    Doing well        Plan/    Continue to gradually increase level of activity    RTC 6 weeks

## 2024-02-12 ENCOUNTER — HOSPITAL ENCOUNTER (OUTPATIENT)
Dept: CARDIOLOGY | Facility: HOSPITAL | Age: 79
Discharge: HOME | End: 2024-02-12
Payer: MEDICARE

## 2024-02-12 DIAGNOSIS — I47.29 VENTRICULAR TACHYCARDIA (PAROXYSMAL) (MULTI): ICD-10-CM

## 2024-02-12 DIAGNOSIS — Z95.810 PRESENCE OF AUTOMATIC CARDIOVERTER/DEFIBRILLATOR (AICD): ICD-10-CM

## 2024-02-12 PROCEDURE — 93296 REM INTERROG EVL PM/IDS: CPT

## 2024-02-12 PROCEDURE — 93295 DEV INTERROG REMOTE 1/2/MLT: CPT | Performed by: INTERNAL MEDICINE

## 2024-02-29 DIAGNOSIS — I15.9 SECONDARY HYPERTENSION: ICD-10-CM

## 2024-03-04 RX ORDER — SPIRONOLACTONE 25 MG/1
TABLET ORAL
Qty: 45 TABLET | Refills: 0 | Status: SHIPPED | OUTPATIENT
Start: 2024-03-04 | End: 2024-03-18 | Stop reason: SDUPTHER

## 2024-03-06 DIAGNOSIS — I15.9 SECONDARY HYPERTENSION: ICD-10-CM

## 2024-03-10 DIAGNOSIS — I15.9 SECONDARY HYPERTENSION: ICD-10-CM

## 2024-03-11 RX ORDER — SPIRONOLACTONE 25 MG/1
TABLET ORAL
OUTPATIENT
Start: 2024-03-11

## 2024-03-12 ENCOUNTER — OFFICE VISIT (OUTPATIENT)
Dept: SURGERY | Facility: CLINIC | Age: 79
End: 2024-03-12
Payer: MEDICARE

## 2024-03-12 VITALS
SYSTOLIC BLOOD PRESSURE: 136 MMHG | DIASTOLIC BLOOD PRESSURE: 85 MMHG | RESPIRATION RATE: 16 BRPM | TEMPERATURE: 97.3 F | HEART RATE: 76 BPM | OXYGEN SATURATION: 98 %

## 2024-03-12 DIAGNOSIS — K40.90 NON-RECURRENT UNILATERAL INGUINAL HERNIA WITHOUT OBSTRUCTION OR GANGRENE: Primary | ICD-10-CM

## 2024-03-12 PROCEDURE — 1126F AMNT PAIN NOTED NONE PRSNT: CPT | Performed by: SURGERY

## 2024-03-12 PROCEDURE — 3075F SYST BP GE 130 - 139MM HG: CPT | Performed by: SURGERY

## 2024-03-12 PROCEDURE — 99024 POSTOP FOLLOW-UP VISIT: CPT | Performed by: SURGERY

## 2024-03-12 PROCEDURE — 1036F TOBACCO NON-USER: CPT | Performed by: SURGERY

## 2024-03-12 PROCEDURE — 1159F MED LIST DOCD IN RCRD: CPT | Performed by: SURGERY

## 2024-03-12 PROCEDURE — 3079F DIAST BP 80-89 MM HG: CPT | Performed by: SURGERY

## 2024-03-12 NOTE — PROGRESS NOTES
Chief complaint/            HPI/    78-year-old male status post 1/17/2024 open repair of right inguinal hernia    Patient feels great performing all regular activities including working out at the gym    Denies any GI or  issues        Physical exam/    Right groin wound clean, dry, soft.  No recurrence        Assessment/    Doing well after repair of right inguinal hernia        Plan/    Patient released unrestricted activity.    RTC as needed

## 2024-03-18 DIAGNOSIS — I15.9 SECONDARY HYPERTENSION: ICD-10-CM

## 2024-03-18 RX ORDER — SPIRONOLACTONE 25 MG/1
12.5 TABLET ORAL DAILY
Qty: 90 TABLET | Refills: 0 | Status: SHIPPED | OUTPATIENT
Start: 2024-03-18 | End: 2024-04-11 | Stop reason: SDUPTHER

## 2024-04-01 ENCOUNTER — OFFICE VISIT (OUTPATIENT)
Dept: NEUROLOGY | Facility: CLINIC | Age: 79
End: 2024-04-01
Payer: MEDICARE

## 2024-04-01 VITALS
HEIGHT: 69 IN | BODY MASS INDEX: 23.4 KG/M2 | WEIGHT: 158 LBS | DIASTOLIC BLOOD PRESSURE: 63 MMHG | SYSTOLIC BLOOD PRESSURE: 99 MMHG | HEART RATE: 78 BPM

## 2024-04-01 DIAGNOSIS — G25.0 ESSENTIAL TREMOR: Primary | ICD-10-CM

## 2024-04-01 PROCEDURE — 1160F RVW MEDS BY RX/DR IN RCRD: CPT | Performed by: PSYCHIATRY & NEUROLOGY

## 2024-04-01 PROCEDURE — 1036F TOBACCO NON-USER: CPT | Performed by: PSYCHIATRY & NEUROLOGY

## 2024-04-01 PROCEDURE — 3078F DIAST BP <80 MM HG: CPT | Performed by: PSYCHIATRY & NEUROLOGY

## 2024-04-01 PROCEDURE — 1159F MED LIST DOCD IN RCRD: CPT | Performed by: PSYCHIATRY & NEUROLOGY

## 2024-04-01 PROCEDURE — 99214 OFFICE O/P EST MOD 30 MIN: CPT | Performed by: PSYCHIATRY & NEUROLOGY

## 2024-04-01 PROCEDURE — 3074F SYST BP LT 130 MM HG: CPT | Performed by: PSYCHIATRY & NEUROLOGY

## 2024-04-01 ASSESSMENT — ENCOUNTER SYMPTOMS
DEPRESSION: 0
LOSS OF SENSATION IN FEET: 0
OCCASIONAL FEELINGS OF UNSTEADINESS: 0

## 2024-04-01 ASSESSMENT — UNIFIED PARKINSONS DISEASE RATING SCALE (UPDRS)
LEG_AGILITY_RIGHT: 0
TOTAL_SCORE: 7
LEVODOPA: YES
RIGIDITY_NECK: 0
DYSKINESIAS_PRESENT: NO
LEG_AGILITY_LEFT: 0
CHAIR_RISING_SCALE: 0
KINETIC_TREMOR_LEFTHAND: 1
KINETIC_TREMOR_RIGHTHAND: 1
TOETAPPING_RIGHT: 0
RIGIDITY_RUE: 0
AMPLITUDE_RLE: 0
AMPLITUDE_LIP_JAW: 0
RIGIDITY_LUE: 0
HANDMOVEMENTS_RIGHT: 0
TOETAPPING_LEFT: 0
FREEZING_GAIT: 0
PRONATION_SUPINATION_LEFT: 0
POSTURAL_STABILITY: 0
PARKINSONS_MEDS: NO
CONSTANCY_TREMOR_ATREST: 0
SPONTANEITY_OF_MOVEMENT: 0
POSTURAL_TREMOR_LEFTHAND: 0
RIGIDITY_LLE: 0
POSTURE: 1
SPEECH: 1
POSTURAL_TREMOR_RIGHTHAND: 0
RIGIDITY_RLE: 0
AMPLITUDE_LUE: 0
AMPLITUDE_RUE: 0
FACIAL_EXPRESSION: 0
FINGER_TAPPING_RIGHT: 0
GAIT: 0
PRONATION_SUPINATION_RIGHT: 0
AMPLITUDE_LLE: 0
FINGER_TAPPING_LEFT: 1

## 2024-04-01 ASSESSMENT — FAHN TOLOSA MARTIN TREMOR (FTM)
UE FINGER TO NOSE AND OTHER ACTIONS: 1
LE LEG FLEXED AT HIPS AND KNEES AT POSTURE: 0
LLE TOTAL SCORE: 0
FACE IN REPOSE: 0
FACE TOTAL: 0
TOUNGE WHEN PROTUDED: 0
UE FINGER TO NOSE AND OTHER ACTIONS: 1
FACE AT POSTURE WHEN STANDING OR SITTING: 0
TOUNGE AT REST: 0
LE TOE TO FINGER IN A FLEXED POSTURE: 0
DRAWING C LEFT SCORE: 1
UE ARM AT REST: 0
LE TOE TO FINGER IN A FLEXED POSTURE: 0
DRAWING B RGHT SCORE: 1
POURING SCORE: 0
RUE TOTAL SCORE: 1
UE ARMS OUTSTRECHED WRISTS MILDLY EXTENDED FINGERS SPREAD APART: 0
TRUNK IN REPOSE: 0
LE LEG FLEXED AT HIPS AND KNEES AT POSTURE: 0
UE ARM AT REST: 0
DRAWING B LEFT SCORE: 2
TRUNK  WHEN SITTING OR STANDING: 0
LE AT REST: 0
HEAD TOTAL SCORE: 0
HEAD AT POSTURE WHEN STANDING OR SITTING: 0
VOICE IN ACTION: 1
HANDWRITING WITH DOMINANT HAND: 1
TOUNGE TOTAL SCORE: 0
UE ARMS OUTSTRECHED WRISTS MILDLY EXTENDED FINGERS SPREAD APART: 0
DRAWING B TOTAL SCORE: 3
RLE TOTAL SCORE: 0
DRAWING C RIGHT SCORE: 0
TRUNK TOTAL SCORE: 0
RUE TOTAL SCORE: 1
VOICE TOTAL SCORE: 1
PART A SUBTOTAL SCORE: 3
DRAWING A RIGHT SCORE: 0
DRAWING C TOTAL SCORE: 1
HEAD IN REPOSE: 0
LE AT REST: 0

## 2024-04-01 ASSESSMENT — PATIENT HEALTH QUESTIONNAIRE - PHQ9
1. LITTLE INTEREST OR PLEASURE IN DOING THINGS: NOT AT ALL
2. FEELING DOWN, DEPRESSED OR HOPELESS: NOT AT ALL
SUM OF ALL RESPONSES TO PHQ9 QUESTIONS 1 AND 2: 0
2. FEELING DOWN, DEPRESSED OR HOPELESS: NOT AT ALL
1. LITTLE INTEREST OR PLEASURE IN DOING THINGS: NOT AT ALL
SUM OF ALL RESPONSES TO PHQ9 QUESTIONS 1 AND 2: 0

## 2024-04-01 NOTE — LETTER
April 1, 2024     Nikki Jang MD  [id number]  [facility] [address]    Patient: KENDY Kendrick   YOB: 1945   Date of Visit: 4/1/2024       Dear Dr. Nikki Jang MD:    Thank you for referring KENDY Kendrick to me for evaluation. Below are my notes for this consultation.  If you have questions, please do not hesitate to call me. I look forward to following your patient along with you.       Sincerely,     Destini Ramos MD      CC: No Recipients  ______________________________________________________________________________________    Subjective    Ab Kendrick is a left handed  78 y.o. year old male who presents with Follow-up. Patient is accompanied by: spouse  Visit type: follow up visit     He states he has difficulty w hand control and dexterity. He has tremor of both hands, only with action.   Handwriting is messy, but not smaller. Can order whatever wants at the restaurant. Can do most fine motor activity.   Can hold a cup coffee fine, does not spill. Uses two hands when using a mouse on PC.   Shaving- uses electrical razor.   There is no voice tremor. No voice or leg tremors.   Agrevating factors; none  Relieving factors etoh.      NO rest tremor, there is no stiffness or slowness of movements.   No changes in gait, no shuffle.   Mild softness of voice.   Balance: feels a little dizzy sometimes, especially if gets up quickly. No falls.      Non motor:   No hyposmia  denies constipation and urinary issues.   Some tightness when swallowing. No choking or food going down wrong pipe. Takes smaller bites.   Sleep is good. Very occasionally has nightmares, and will kick or shout. Happens once a month   Memory is good.      He loves to garden, he works out 3 times a week, does weights etc.      Family hx:   father was a physician whom gave up surgery due to tremors.     Patient Active Problem List   Diagnosis   • Anemia   • Angina pectoris (CMS/HCC)   • Atrial flutter (CMS/HCC)    • Atrial tachycardia, paroxysmal (CMS/HCC)   • Chronic combined systolic and diastolic heart failure, NYHA class 2 (CMS/HCC)   • Chronic diarrhea   • Chronic sinusitis   • Cogwheel rigidity   • COPD (chronic obstructive pulmonary disease) (CMS/HCC)   • CAD (coronary artery disease)   • Erectile dysfunction   • Familial hypercholesteremia   • HTN (hypertension)   • Hyperlipidemia   • Hyponatremia   • ICD (implantable cardioverter-defibrillator) in place   • Ischemic cardiomyopathy   • Knee pain, left   • Left inguinal hernia   • Macrocytosis without anemia   • Monoclonal gammopathy of unknown significance   • Nasal obstruction   • Nasal septal deviation   • PAC (premature atrial contraction)   • Paroxysmal atrial fibrillation (CMS/HCC)   • Persistent atrial fibrillation (CMS/HCC)   • PVC (premature ventricular contraction)   • Right trigger finger   • Trigger finger of right thumb   • Systolic heart failure (CMS/HCC)   • Non-recurrent unilateral inguinal hernia without obstruction or gangrene      Past Medical History:   Diagnosis Date   • CAD (coronary artery disease) 08/27/2023   • Chronic combined systolic and diastolic heart failure, NYHA class 2 (CMS/HCC) 08/27/2023   • Chronic rhinitis     Rhinitis   • Diverticulosis of intestine, part unspecified, without perforation or abscess without bleeding     Diverticulosis   • HTN (hypertension) 08/27/2023   • Hyperlipidemia 08/27/2023   • Myocardial infarction (CMS/HCC)    • Persistent atrial fibrillation (CMS/HCC) 08/27/2023      Past Surgical History:   Procedure Laterality Date   • CARDIAC CATHETERIZATION  01/31/2017    Patent LAD and RCA stents.   • CORONARY ANGIOPLASTY WITH STENT PLACEMENT  12/02/2010    PCI to LAD   • CORONARY ANGIOPLASTY WITH STENT PLACEMENT  05/19/2011    PCI to RCA   • OTHER SURGICAL HISTORY  10/19/2021    Hernia repair   • OTHER SURGICAL HISTORY  10/19/2021    Complete colonoscopy   • OTHER SURGICAL HISTORY  10/19/2021    Cardioverter  defibrillator insertion   • OTHER SURGICAL HISTORY  10/19/2021    Tonsillectomy   • OTHER SURGICAL HISTORY  10/19/2021    Cataract surgery      Social History     Socioeconomic History   • Marital status:      Spouse name: Not on file   • Number of children: Not on file   • Years of education: Not on file   • Highest education level: Not on file   Occupational History   • Not on file   Tobacco Use   • Smoking status: Former     Types: Cigarettes     Quit date:      Years since quittin.2   • Smokeless tobacco: Never   Vaping Use   • Vaping Use: Never used   Substance and Sexual Activity   • Alcohol use: Yes     Alcohol/week: 8.0 standard drinks of alcohol     Types: 8 Cans of beer per week     Comment: occasional   • Drug use: Never   • Sexual activity: Yes   Other Topics Concern   • Not on file   Social History Narrative   • Not on file     Social Determinants of Health     Financial Resource Strain: Not on file   Food Insecurity: Not on file   Transportation Needs: Not on file   Physical Activity: Not on file   Stress: Not on file   Social Connections: Not on file   Intimate Partner Violence: Not on file   Housing Stability: Not on file      Family History   Problem Relation Name Age of Onset   • Cancer Father     • Prostate cancer Brother        Patient Health Questionnaire-2 Score: 0        Current Outpatient Medications on File Prior to Visit   Medication Sig Dispense Refill   • coenzyme Q-10 100 mg capsule Take 1 capsule (100 mg) by mouth once daily.     • dabigatran etexilate (Pradaxa) 150 mg capsule Take 1 capsule (150 mg) by mouth 2 times a day. 90 capsule 3   • dapagliflozin propanediol (Farxiga) 10 mg Take 1 tablet (10 mg) by mouth once daily. 90 tablet 3   • furosemide (Lasix) 20 mg tablet Take 1 tablet (20 mg) by mouth once daily as needed (swelling).     • glucosamine sulfate 1,000 mg capsule Take 2 capsules by mouth once daily.     • metoprolol succinate XL (Toprol-XL) 25 mg 24 hr  tablet Take 1 tablet (25 mg) by mouth once daily.     • multivitamin tablet Take 1 tablet by mouth once daily.     • rosuvastatin (Crestor) 20 mg tablet Take 1 tablet (20 mg) by mouth once daily.     • sacubitriL-valsartan (Entresto)  mg tablet Take 1 tablet by mouth 2 times a day. 180 tablet 3   • sildenafil (Viagra) 50 mg tablet Take 1 tablet (50 mg) by mouth once daily as needed for erectile dysfunction. 50 tablet 1   • spironolactone (Aldactone) 25 mg tablet Take 0.5 tablets (12.5 mg) by mouth once daily. 90 tablet 0   • vitamin A 2,400 mcg capsule Take 1 capsule (2.4 mg) by mouth once daily.     • [DISCONTINUED] acetaminophen-codeine (Tylenol w/ Codeine #3) 300-30 mg tablet Take 1 tablet by mouth every 6 hours if needed for severe pain (7 - 10). (Patient not taking: Reported on 1/30/2024) 15 tablet 0   • [DISCONTINUED] biotin 1 mg tablet Take 1 tablet (1 mg) by mouth once daily.     • [DISCONTINUED] chlorhexidine (Peridex) 0.12 % solution Sig: 15 mls swish and spit the night before surgery and 15mls swish and spit the morning of surgery do not swallow (Patient not taking: Reported on 1/30/2024) 473 mL 0     No current facility-administered medications on file prior to visit.      No Known Allergies     Review of Systems  All other system have been reviewed and are negative for complaint.  Objective  Vitals:    04/01/24 1200   BP: 99/63   Pulse: 78      Neurological Exam      MDS UPDRS 1st Score: Motor Examination  Is the patient on medication for treating the symptoms of Parkinson's Disease?: No  Is the patient on Levodopa?: Yes  Speech: 1  Facial Expression: 0  Rigidty Neck: 0  Rigidty RUE: 0  Rigidity - LUE: 0  Rigidity RLE: 0  Rigidity LLE: 0  Finger Tapping Right Hand: 0  Finger Tapping Left Hand: 1  Hand Movements- Right Hand: 0  Hand Movements- Left Hand: 2  Pronatiaon-Supination Movments - Right Hand: 0  Pronatiaon-Supination Movments Left Hand: 0  Toe Tapping Right Foot: 0  Toe Tapping - Left  Foot: 0  Leg Agility - Right Le  Leg Agility - Left le  Arising from Chair: 0  Gait: 0  Freezing of Gait: 0  Postural Stability: 0  Posture: 1  Global Spontanteity of Movment ( Body Bradykinesia): 0  Postural Tremor - Right Hand: 0  Postural Tremor - Left hand: 0  Kinetic Tremor - Right hand: 1  Kinetic Tremor - Left hand: 1  Rest Tremor Amplitude - RUE: 0  Rest Tremor Amplitude - LUE: 0  Rest Tremor Amplitude - RLE: 0  Rest Tremor Amplitude - LLE: 0  Rest Tremor Amplitude - Lip/Jaw: 0  Constancy of Rest Tremor: 0  MDS UPDRS Total Score: 7  Were dyskinesias (chorea or dystonia) present during examination?: No      Fahn Bobby Jain tremor rating scale:  PART A Face at rest: 0, Face at posture: 0, Face Total: 0, Tongue at rest: 0, Tongue at posture: 0, Tongue total: 0, Voice in action: 1, Voice total: 1, Head at rest: 0, Head at posture: 0, Head total: 0, RUE at rest: 0, RUE at posture: 0, RUE in action: 1, RUE total: 1, LUE at rest: 0, LUE at posture: 0, LUE in action: 1, RUE total: 1, Trunk at rest: 0, Trunk at posture: 0, Trunk total: 0, RLE at rest: 0, RLE at posture: 0, RLE in action: 0, RLE total: 0, LLE at rest: 0, LLE at posture: 0, LLE in action: 0, LLE total: 0, Part A subtotal: 3 PART B Handwriting dominant: 1, Drawing A - Right: 0, Drawing B - Right: 1, Drawing B - Left: 2, Drawing B total: 3, Drawing C - Right: 0, Drawing C - Left: 1, Pourin, Drawing C total: 1         TSH   Date Value Ref Range Status   2023 2.00 0.44 - 3.98 mIU/L Final     Comment:      TSH testing is performed using different testing    methodology at [facility] than at other    [facility]. Direct result comparisons should    only be made within the same method.       Folate   Date Value Ref Range Status   10/08/2020 >23.3 >5.0 ng/mL Final     Comment:     Low           <3.4  Borderline 3.4-5.0  Normal        >5.0  .   Patients receiving more than 5 mg/day of biotin may have interference   in test  results. A sample should be taken no sooner than eight hours   after previous dose. Contact the testing laboratory for additional   information.              Assessment/Plan      Ab Kendrick is a 78 y.o. year old male here for follow up of essential tremor.    Treatment's for essential tremor include several pharmacological agents. The American Academy of Neurology guidelines for essential tremor, gives Level A evidence to the use of Propranolol and primidone. There is level B evidence for gabapentin, topamax and benzodiazepines. (Vikas et al Neurology 2005, with update in 2011)     Given his medical issues, i would advise against primidone - which can interact w his Pradaxa. Also he is already on metoprolol and as per cardiology changing to propranolol was not advisable.  I think Gabapentin could be next possible option if needed.   Given that he has limited impact on his day to day due to the tremor, we agreed to hold off w medications at this time.   Also gives history of symptoms  suggestive of REM sleep behavior disorder, this is acting out of dreams, this can be a precursor  symptom of Parkinson's disease, and I will watch for this, but there are no other symptoms at this time.     RTC in 9 months.

## 2024-04-01 NOTE — PROGRESS NOTES
Subjective     Ab Kendrick is a left handed  78 y.o. year old male who presents with Follow-up. Patient is accompanied by: spouse  Visit type: follow up visit     He states he has difficulty w hand control and dexterity. He has tremor of both hands, only with action.   Handwriting is messy, but not smaller. Can order whatever wants at the restaurant. Can do most fine motor activity.   Can hold a cup coffee fine, does not spill. Uses two hands when using a mouse on PC.   Shaving- uses electrical razor.   There is no voice tremor. No voice or leg tremors.   Agrevating factors; none  Relieving factors etoh.      NO rest tremor, there is no stiffness or slowness of movements.   No changes in gait, no shuffle.   Mild softness of voice.   Balance: feels a little dizzy sometimes, especially if gets up quickly. No falls.      Non motor:   No hyposmia  denies constipation and urinary issues.   Some tightness when swallowing. No choking or food going down wrong pipe. Takes smaller bites.   Sleep is good. Very occasionally has nightmares, and will kick or shout. Happens once a month   Memory is good.      He loves to garden, he works out 3 times a week, does weights etc.      Family hx:   father was a physician whom gave up surgery due to tremors.     Patient Active Problem List   Diagnosis    Anemia    Angina pectoris (CMS/HCC)    Atrial flutter (CMS/HCC)    Atrial tachycardia, paroxysmal (CMS/HCC)    Chronic combined systolic and diastolic heart failure, NYHA class 2 (CMS/HCC)    Chronic diarrhea    Chronic sinusitis    Cogwheel rigidity    COPD (chronic obstructive pulmonary disease) (CMS/HCC)    CAD (coronary artery disease)    Erectile dysfunction    Familial hypercholesteremia    HTN (hypertension)    Hyperlipidemia    Hyponatremia    ICD (implantable cardioverter-defibrillator) in place    Ischemic cardiomyopathy    Knee pain, left    Left inguinal hernia    Macrocytosis without anemia    Monoclonal gammopathy of  unknown significance    Nasal obstruction    Nasal septal deviation    PAC (premature atrial contraction)    Paroxysmal atrial fibrillation (CMS/HCC)    Persistent atrial fibrillation (CMS/HCC)    PVC (premature ventricular contraction)    Right trigger finger    Trigger finger of right thumb    Systolic heart failure (CMS/HCC)    Non-recurrent unilateral inguinal hernia without obstruction or gangrene      Past Medical History:   Diagnosis Date    CAD (coronary artery disease) 2023    Chronic combined systolic and diastolic heart failure, NYHA class 2 (CMS/HCC) 2023    Chronic rhinitis     Rhinitis    Diverticulosis of intestine, part unspecified, without perforation or abscess without bleeding     Diverticulosis    HTN (hypertension) 2023    Hyperlipidemia 2023    Myocardial infarction (CMS/HCC)     Persistent atrial fibrillation (CMS/HCC) 2023      Past Surgical History:   Procedure Laterality Date    CARDIAC CATHETERIZATION  2017    Patent LAD and RCA stents.    CORONARY ANGIOPLASTY WITH STENT PLACEMENT  2010    PCI to LAD    CORONARY ANGIOPLASTY WITH STENT PLACEMENT  2011    PCI to RCA    OTHER SURGICAL HISTORY  10/19/2021    Hernia repair    OTHER SURGICAL HISTORY  10/19/2021    Complete colonoscopy    OTHER SURGICAL HISTORY  10/19/2021    Cardioverter defibrillator insertion    OTHER SURGICAL HISTORY  10/19/2021    Tonsillectomy    OTHER SURGICAL HISTORY  10/19/2021    Cataract surgery      Social History     Socioeconomic History    Marital status:      Spouse name: Not on file    Number of children: Not on file    Years of education: Not on file    Highest education level: Not on file   Occupational History    Not on file   Tobacco Use    Smoking status: Former     Types: Cigarettes     Quit date:      Years since quittin.2    Smokeless tobacco: Never   Vaping Use    Vaping Use: Never used   Substance and Sexual Activity    Alcohol use: Yes      Alcohol/week: 8.0 standard drinks of alcohol     Types: 8 Cans of beer per week     Comment: occasional    Drug use: Never    Sexual activity: Yes   Other Topics Concern    Not on file   Social History Narrative    Not on file     Social Determinants of Health     Financial Resource Strain: Not on file   Food Insecurity: Not on file   Transportation Needs: Not on file   Physical Activity: Not on file   Stress: Not on file   Social Connections: Not on file   Intimate Partner Violence: Not on file   Housing Stability: Not on file      Family History   Problem Relation Name Age of Onset    Cancer Father      Prostate cancer Brother        Patient Health Questionnaire-2 Score: 0        Current Outpatient Medications on File Prior to Visit   Medication Sig Dispense Refill    coenzyme Q-10 100 mg capsule Take 1 capsule (100 mg) by mouth once daily.      dabigatran etexilate (Pradaxa) 150 mg capsule Take 1 capsule (150 mg) by mouth 2 times a day. 90 capsule 3    dapagliflozin propanediol (Farxiga) 10 mg Take 1 tablet (10 mg) by mouth once daily. 90 tablet 3    furosemide (Lasix) 20 mg tablet Take 1 tablet (20 mg) by mouth once daily as needed (swelling).      glucosamine sulfate 1,000 mg capsule Take 2 capsules by mouth once daily.      metoprolol succinate XL (Toprol-XL) 25 mg 24 hr tablet Take 1 tablet (25 mg) by mouth once daily.      multivitamin tablet Take 1 tablet by mouth once daily.      rosuvastatin (Crestor) 20 mg tablet Take 1 tablet (20 mg) by mouth once daily.      sacubitriL-valsartan (Entresto)  mg tablet Take 1 tablet by mouth 2 times a day. 180 tablet 3    sildenafil (Viagra) 50 mg tablet Take 1 tablet (50 mg) by mouth once daily as needed for erectile dysfunction. 50 tablet 1    spironolactone (Aldactone) 25 mg tablet Take 0.5 tablets (12.5 mg) by mouth once daily. 90 tablet 0    vitamin A 2,400 mcg capsule Take 1 capsule (2.4 mg) by mouth once daily.      [DISCONTINUED] acetaminophen-codeine  (Tylenol w/ Codeine #3) 300-30 mg tablet Take 1 tablet by mouth every 6 hours if needed for severe pain (7 - 10). (Patient not taking: Reported on 2024) 15 tablet 0    [DISCONTINUED] biotin 1 mg tablet Take 1 tablet (1 mg) by mouth once daily.      [DISCONTINUED] chlorhexidine (Peridex) 0.12 % solution Sig: 15 mls swish and spit the night before surgery and 15mls swish and spit the morning of surgery do not swallow (Patient not taking: Reported on 2024) 473 mL 0     No current facility-administered medications on file prior to visit.      No Known Allergies     Review of Systems  All other system have been reviewed and are negative for complaint.  Objective   Vitals:    24 1200   BP: 99/63   Pulse: 78      Neurological Exam      MDS UPDRS 1st Score: Motor Examination  Is the patient on medication for treating the symptoms of Parkinson's Disease?: No  Is the patient on Levodopa?: Yes  Speech: 1  Facial Expression: 0  Rigidty Neck: 0  Rigidty RUE: 0  Rigidity - LUE: 0  Rigidity RLE: 0  Rigidity LLE: 0  Finger Tapping Right Hand: 0  Finger Tapping Left Hand: 1  Hand Movements- Right Hand: 0  Hand Movements- Left Hand: 2  Pronatiaon-Supination Movments - Right Hand: 0  Pronatiaon-Supination Movments Left Hand: 0  Toe Tapping Right Foot: 0  Toe Tapping - Left Foot: 0  Leg Agility - Right Le  Leg Agility - Left le  Arising from Chair: 0  Gait: 0  Freezing of Gait: 0  Postural Stability: 0  Posture: 1  Global Spontanteity of Movment ( Body Bradykinesia): 0  Postural Tremor - Right Hand: 0  Postural Tremor - Left hand: 0  Kinetic Tremor - Right hand: 1  Kinetic Tremor - Left hand: 1  Rest Tremor Amplitude - RUE: 0  Rest Tremor Amplitude - LUE: 0  Rest Tremor Amplitude - RLE: 0  Rest Tremor Amplitude - LLE: 0  Rest Tremor Amplitude - Lip/Jaw: 0  Constancy of Rest Tremor: 0  MDS UPDRS Total Score: 7  Were dyskinesias (chorea or dystonia) present during examination?: No      Fahn Bobby Jain tremor  rating scale:  PART A Face at rest: 0, Face at posture: 0, Face Total: 0, Tongue at rest: 0, Tongue at posture: 0, Tongue total: 0, Voice in action: 1, Voice total: 1, Head at rest: 0, Head at posture: 0, Head total: 0, RUE at rest: 0, RUE at posture: 0, RUE in action: 1, RUE total: 1, LUE at rest: 0, LUE at posture: 0, LUE in action: 1, RUE total: 1, Trunk at rest: 0, Trunk at posture: 0, Trunk total: 0, RLE at rest: 0, RLE at posture: 0, RLE in action: 0, RLE total: 0, LLE at rest: 0, LLE at posture: 0, LLE in action: 0, LLE total: 0, Part A subtotal: 3 PART B Handwriting dominant: 1, Drawing A - Right: 0, Drawing B - Right: 1, Drawing B - Left: 2, Drawing B total: 3, Drawing C - Right: 0, Drawing C - Left: 1, Pourin, Drawing C total: 1         TSH   Date Value Ref Range Status   2023 2.00 0.44 - 3.98 mIU/L Final     Comment:      TSH testing is performed using different testing    methodology at [facility] than at other    [facility]. Direct result comparisons should    only be made within the same method.       Folate   Date Value Ref Range Status   10/08/2020 >23.3 >5.0 ng/mL Final     Comment:     Low           <3.4  Borderline 3.4-5.0  Normal        >5.0  .   Patients receiving more than 5 mg/day of biotin may have interference   in test results. A sample should be taken no sooner than eight hours   after previous dose. Contact the testing laboratory for additional   information.              Assessment/Plan       Ab Kendrick is a 78 y.o. year old male here for follow up of essential tremor.    Treatment's for essential tremor include several pharmacological agents. The American Academy of Neurology guidelines for essential tremor, gives Level A evidence to the use of Propranolol and primidone. There is level B evidence for gabapentin, topamax and benzodiazepines. (Vikas et al Neurology 2005, with update in )     Given his medical issues, i would advise against  primidone - which can interact w his Pradaxa. Also he is already on metoprolol and as per cardiology changing to propranolol was not advisable.  I think Gabapentin could be next possible option if needed.   Given that he has limited impact on his day to day due to the tremor, we agreed to hold off w medications at this time.   Also gives history of symptoms  suggestive of REM sleep behavior disorder, this is acting out of dreams, this can be a precursor  symptom of Parkinson's disease, and I will watch for this, but there are no other symptoms at this time.     RTC in 9 months.

## 2024-04-01 NOTE — PATIENT INSTRUCTIONS
It was nice to see you today.   You have essential tremor.     We could do a trial of a medication called Gabapentin- typically well tolerated but can cause sedation and gait imbalance,     You have some symptoms suggestive of REM sleep behavior disorder, this is acting out of dreams, this can be a precursor  symptom of Parkinson's disease, and I will watch for this, but there are no other symptoms at this time.       RTC in 9 months.

## 2024-04-08 DIAGNOSIS — I25.10 CORONARY ARTERY DISEASE, UNSPECIFIED VESSEL OR LESION TYPE, UNSPECIFIED WHETHER ANGINA PRESENT, UNSPECIFIED WHETHER NATIVE OR TRANSPLANTED HEART: ICD-10-CM

## 2024-04-08 RX ORDER — DAPAGLIFLOZIN 10 MG/1
10 TABLET, FILM COATED ORAL DAILY
Qty: 90 TABLET | Refills: 3 | Status: SHIPPED | OUTPATIENT
Start: 2024-04-08 | End: 2024-04-08 | Stop reason: SDUPTHER

## 2024-04-08 RX ORDER — DABIGATRAN ETEXILATE 150 MG/1
150 CAPSULE ORAL 2 TIMES DAILY
Qty: 180 CAPSULE | Refills: 3 | Status: SHIPPED | OUTPATIENT
Start: 2024-04-08 | End: 2024-04-11 | Stop reason: SDUPTHER

## 2024-04-08 RX ORDER — DAPAGLIFLOZIN 10 MG/1
10 TABLET, FILM COATED ORAL DAILY
Qty: 90 TABLET | Refills: 3 | Status: SHIPPED | OUTPATIENT
Start: 2024-04-08 | End: 2025-04-08

## 2024-04-11 ENCOUNTER — OFFICE VISIT (OUTPATIENT)
Dept: CARDIOLOGY | Facility: CLINIC | Age: 79
End: 2024-04-11
Payer: MEDICARE

## 2024-04-11 VITALS
BODY MASS INDEX: 22.96 KG/M2 | HEIGHT: 69 IN | WEIGHT: 155 LBS | SYSTOLIC BLOOD PRESSURE: 104 MMHG | DIASTOLIC BLOOD PRESSURE: 63 MMHG | HEART RATE: 74 BPM | OXYGEN SATURATION: 98 %

## 2024-04-11 DIAGNOSIS — I48.0 PAROXYSMAL ATRIAL FIBRILLATION (MULTI): ICD-10-CM

## 2024-04-11 DIAGNOSIS — I50.42 CHRONIC COMBINED SYSTOLIC AND DIASTOLIC HEART FAILURE, NYHA CLASS 2 (MULTI): Primary | ICD-10-CM

## 2024-04-11 DIAGNOSIS — I25.10 CORONARY ARTERY DISEASE, UNSPECIFIED VESSEL OR LESION TYPE, UNSPECIFIED WHETHER ANGINA PRESENT, UNSPECIFIED WHETHER NATIVE OR TRANSPLANTED HEART: ICD-10-CM

## 2024-04-11 DIAGNOSIS — E78.5 DYSLIPIDEMIA: ICD-10-CM

## 2024-04-11 DIAGNOSIS — I15.9 SECONDARY HYPERTENSION: ICD-10-CM

## 2024-04-11 DIAGNOSIS — I25.10 CORONARY ARTERY DISEASE INVOLVING NATIVE CORONARY ARTERY OF NATIVE HEART WITHOUT ANGINA PECTORIS: ICD-10-CM

## 2024-04-11 PROBLEM — I10 HTN (HYPERTENSION): Status: RESOLVED | Noted: 2023-08-27 | Resolved: 2024-04-11

## 2024-04-11 PROBLEM — I20.9 ANGINA PECTORIS (CMS-HCC): Status: RESOLVED | Noted: 2023-08-27 | Resolved: 2024-04-11

## 2024-04-11 PROBLEM — I50.20 SYSTOLIC HEART FAILURE (MULTI): Status: RESOLVED | Noted: 2023-08-27 | Resolved: 2024-04-11

## 2024-04-11 PROBLEM — E78.01 FAMILIAL HYPERCHOLESTEREMIA: Status: RESOLVED | Noted: 2023-08-27 | Resolved: 2024-04-11

## 2024-04-11 PROBLEM — I48.19 PERSISTENT ATRIAL FIBRILLATION (MULTI): Status: RESOLVED | Noted: 2023-08-27 | Resolved: 2024-04-11

## 2024-04-11 PROCEDURE — 1036F TOBACCO NON-USER: CPT | Performed by: INTERNAL MEDICINE

## 2024-04-11 PROCEDURE — 3078F DIAST BP <80 MM HG: CPT | Performed by: INTERNAL MEDICINE

## 2024-04-11 PROCEDURE — 3074F SYST BP LT 130 MM HG: CPT | Performed by: INTERNAL MEDICINE

## 2024-04-11 PROCEDURE — 1159F MED LIST DOCD IN RCRD: CPT | Performed by: INTERNAL MEDICINE

## 2024-04-11 PROCEDURE — 1160F RVW MEDS BY RX/DR IN RCRD: CPT | Performed by: INTERNAL MEDICINE

## 2024-04-11 PROCEDURE — 99214 OFFICE O/P EST MOD 30 MIN: CPT | Performed by: INTERNAL MEDICINE

## 2024-04-11 PROCEDURE — 1126F AMNT PAIN NOTED NONE PRSNT: CPT | Performed by: INTERNAL MEDICINE

## 2024-04-11 RX ORDER — SACUBITRIL AND VALSARTAN 97; 103 MG/1; MG/1
1 TABLET, FILM COATED ORAL 2 TIMES DAILY
Qty: 180 TABLET | Refills: 3 | Status: SHIPPED | OUTPATIENT
Start: 2024-04-11 | End: 2024-04-25 | Stop reason: SDUPTHER

## 2024-04-11 RX ORDER — SPIRONOLACTONE 25 MG/1
12.5 TABLET ORAL DAILY
Qty: 45 TABLET | Refills: 3 | Status: SHIPPED | OUTPATIENT
Start: 2024-04-11 | End: 2025-04-11

## 2024-04-11 RX ORDER — DABIGATRAN ETEXILATE 150 MG/1
150 CAPSULE ORAL 2 TIMES DAILY
Qty: 180 CAPSULE | Refills: 3 | Status: SHIPPED | OUTPATIENT
Start: 2024-04-11 | End: 2025-04-11

## 2024-04-11 ASSESSMENT — PAIN SCALES - GENERAL: PAINLEVEL: 0-NO PAIN

## 2024-04-11 NOTE — PROGRESS NOTES
"Chief Complaint:   No chief complaint on file.     History Of Present Illness:    Ab Kendrick \"KENDY\" is a 78 y.o. male with a history of chronic systolic and diastolic heart failure, CAD (anterolateral STEMI 12/2/2010 with PCI to the LAD and staged PCI to the RCA), dyslipidemia, hypertension, ventricular tachycardia with Medtronic ICD (MRI safe), chronic atrial fibrillation on long-term oral anticoagulation, and pulmonary hypertension here for routine follow-up.     He continues to do very well.  Denies any exertional chest pain or shortness of breath.  Has had no leg swelling.  Denies any palpitations.     He is also noticed erectile dysfunction since starting on the Entresto and spironolactone.     Echocardiogram 9/27/2021: EF 38% with moderately dilated LV. Mild left atrial enlargement. ICD/pacer leads noted in the RA and RV. Mild MR and moderate TR. Trace AI. RVSP 57 mmHg.     Catheterization 1/31/2017: Patent LAD and RCA stents. Moderate pulmonary hypertension with elevated LVEDP of 25 mmHg. EF 20%.     PCI to the LAD 12/2/2010 with HUGH. PCI to RCA 5/19/2011      Past Medical History:  He has a past medical history of CAD (coronary artery disease) (08/27/2023), Chronic combined systolic and diastolic heart failure, NYHA class 2 (CMS/HCC) (08/27/2023), Chronic rhinitis, Diverticulosis of intestine, part unspecified, without perforation or abscess without bleeding, HTN (hypertension) (08/27/2023), Hyperlipidemia (08/27/2023), Myocardial infarction (CMS/[facility]), and Persistent atrial fibrillation (CMS/[facility]) (08/27/2023).    Past Surgical History:  He has a past surgical history that includes Other surgical history (10/19/2021); Other surgical history (10/19/2021); Other surgical history (10/19/2021); Other surgical history (10/19/2021); Other surgical history (10/19/2021); Coronary angioplasty with stent (12/02/2010); Coronary angioplasty with stent (05/19/2011); and Cardiac catheterization (01/31/2017).    " "  Social History:  He reports that he quit smoking about 11 years ago. His smoking use included cigarettes. He has never used smokeless tobacco. He reports current alcohol use of about 8.0 standard drinks of alcohol per week. He reports that he does not use drugs.    Family History:  Family History   Problem Relation Name Age of Onset    Cancer Father      Prostate cancer Brother          Allergies:  Patient has no known allergies.    Outpatient Medications:  Current Outpatient Medications   Medication Instructions    coenzyme Q-10 100 mg, oral, Daily    dabigatran etexilate (PRADAXA) 150 mg, oral, 2 times daily    dapagliflozin propanediol (FARXIGA) 10 mg, oral, Daily    furosemide (Lasix) 20 mg tablet 1 tablet, oral, Daily PRN    glucosamine sulfate 1,000 mg capsule 2 capsules, oral, Daily    metoprolol succinate XL (Toprol-XL) 25 mg 24 hr tablet 1 tablet, oral, Daily    multivitamin tablet 1 tablet, oral, Daily    rosuvastatin (Crestor) 20 mg tablet 1 tablet, oral, Daily    sacubitriL-valsartan (Entresto)  mg tablet 1 tablet, oral, 2 times daily    sildenafil (VIAGRA) 50 mg, oral, Daily PRN    spironolactone (ALDACTONE) 12.5 mg, oral, Daily    vitamin A 8,000 Units, oral, Daily       Last Recorded Vitals:  Visit Vitals  /63 (BP Location: Left arm, Patient Position: Sitting, BP Cuff Size: Adult)   Pulse 74   Ht 1.753 m (5' 9\")   Wt 70.3 kg (155 lb)   SpO2 98%   BMI 22.89 kg/m²   Smoking Status Former   BSA 1.85 m²      LASTWT(3):   Wt Readings from Last 3 Encounters:   04/11/24 70.3 kg (155 lb)   04/01/24 71.7 kg (158 lb)   01/17/24 72.1 kg (159 lb)       Physical Exam:  In general: alert and in no acute distress.   HEENT: Carotid upstrokes normal with no bruits. JVP is normal.   Pulmonary: Clear to auscultation bilaterally.  Cardiovascular: S1,S2, regular.  II/VI systolic murmur at the apex.   Lower extremities: Warm. 2+ distal pulses. No edema.     Last Labs:  CBC -  Recent Labs     01/03/24  0916 " "08/18/23  1219 04/20/23  1031   WBC 4.5 3.9* 4.4   HGB 14.4 13.3* 13.6   HCT 45.1 41.6 41.7    229 253   MCV 99 98 98       CMP -  Recent Labs     01/03/24  0916 08/18/23  1219 04/20/23  1031 07/16/21  1142 04/16/21  1133 03/18/21  0703 03/18/21  0647 12/31/20  0943   * 131* 130*   < > 131*  --    < > 134*   K 4.7 5.1 4.7   < > 5.6*  --    < > 4.3   CL 97* 96* 97*   < > 95*  --    < > 98   CO2 29 29 27   < > 31  --    < > 30   ANIONGAP 13 11 11   < > 11  --    < > 10   BUN 17 16 21   < > 17  --    < > 21   CREATININE 1.04 1.10 1.12   < > 1.07  --    < > 1.10   EGFR 73  --   --   --   --   --   --   --    MG  --   --   --   --  1.90 1.80  --  1.90    < > = values in this interval not displayed.     Recent Labs     08/18/23  1219 04/20/23  1031 07/01/22  1205   ALBUMIN 4.4 4.6 4.2   ALKPHOS 47 63 59   ALT 16 16 15   AST 24 26 23   BILITOT 0.4 0.6 0.4       LIPID PANEL -   Recent Labs     08/18/23  1219 04/20/23  1031 07/01/22  1205   CHOL 163 177 173   LDLF 83 94 87   HDL 63.3 69.4 73.0   TRIG 83 69 66       No results for input(s): \"BNP\", \"HGBA1C\" in the last 54493 hours.        Assessment/Plan   1) chronic combined systolic and diastolic heart failure: He is euvolemic.  Would like an echocardiogram to see if his LV function is normalized.  If so then we could consider having him switch his metoprolol to propranolol for his tremors.    In the event that his LV function is still depressed then I would stay on his current medicines.     2) CAD: Stable from a symptom standpoint. Based on the ISCHEMIA Trial there is no need for routine stress testing.     3) dyslipidemia: Continue with rosuvastatin 20 mg daily.     4) paroxysmal atrial fibrillation: Continue long-term oral anticoagulation and rate control.     5) erectile dysfunction: It is okay that he continue to use Viagra as needed.     6) follow-up: 6 months or sooner if needed       Rodriguez Clements MD  "

## 2024-04-16 DIAGNOSIS — K21.9 GASTROESOPHAGEAL REFLUX DISEASE WITHOUT ESOPHAGITIS: Primary | ICD-10-CM

## 2024-04-16 RX ORDER — PANTOPRAZOLE SODIUM 40 MG/1
40 TABLET, DELAYED RELEASE ORAL AS NEEDED
Qty: 90 TABLET | Refills: 0 | Status: SHIPPED | OUTPATIENT
Start: 2024-04-16 | End: 2024-04-30 | Stop reason: WASHOUT

## 2024-04-16 RX ORDER — PANTOPRAZOLE SODIUM 40 MG/1
40 TABLET, DELAYED RELEASE ORAL AS NEEDED
COMMUNITY
End: 2024-04-16 | Stop reason: SDUPTHER

## 2024-04-16 NOTE — TELEPHONE ENCOUNTER
Patient called requesting refills on Pantoprazole sodium 40 mg 1 a day as needed to [facility]. Patient had no pending apt and was transferred to the  to schedule.   Medication was not in current med list so I added it.

## 2024-04-23 ENCOUNTER — TELEPHONE (OUTPATIENT)
Dept: CARDIOLOGY | Facility: CLINIC | Age: 79
End: 2024-04-23

## 2024-04-23 DIAGNOSIS — I50.42 CHRONIC COMBINED SYSTOLIC AND DIASTOLIC HEART FAILURE, NYHA CLASS 2 (MULTI): ICD-10-CM

## 2024-04-23 RX ORDER — SACUBITRIL AND VALSARTAN 97; 103 MG/1; MG/1
1 TABLET, FILM COATED ORAL 2 TIMES DAILY
Qty: 90 TABLET | Refills: 3 | OUTPATIENT
Start: 2024-04-23 | End: 2025-04-23

## 2024-04-23 NOTE — TELEPHONE ENCOUNTER
Pt requesting refill for sacubitriL-valsartan (Entresto) 200mg tablet, take 1 tab 2 times a day, 90 day supply. He wants the script faxed to a place in [place]. It's called [facility].  the phone # is [phone] and fax # is [phone]

## 2024-04-25 DIAGNOSIS — I50.42 CHRONIC COMBINED SYSTOLIC AND DIASTOLIC HEART FAILURE, NYHA CLASS 2 (MULTI): ICD-10-CM

## 2024-04-25 RX ORDER — SACUBITRIL AND VALSARTAN 97; 103 MG/1; MG/1
1 TABLET, FILM COATED ORAL 2 TIMES DAILY
Qty: 180 TABLET | Refills: 3 | Status: SHIPPED | OUTPATIENT
Start: 2024-04-25 | End: 2025-04-25

## 2024-04-30 ENCOUNTER — OFFICE VISIT (OUTPATIENT)
Dept: CARDIOLOGY | Facility: CLINIC | Age: 79
End: 2024-04-30
Payer: MEDICARE

## 2024-04-30 ENCOUNTER — ANCILLARY PROCEDURE (OUTPATIENT)
Dept: CARDIOLOGY | Facility: CLINIC | Age: 79
End: 2024-04-30
Payer: MEDICARE

## 2024-04-30 VITALS
DIASTOLIC BLOOD PRESSURE: 80 MMHG | HEART RATE: 67 BPM | HEIGHT: 69 IN | BODY MASS INDEX: 23.52 KG/M2 | TEMPERATURE: 98.2 F | SYSTOLIC BLOOD PRESSURE: 110 MMHG | WEIGHT: 158.8 LBS

## 2024-04-30 DIAGNOSIS — I25.5 ISCHEMIC CARDIOMYOPATHY: Primary | ICD-10-CM

## 2024-04-30 DIAGNOSIS — I47.29 PAROXYSMAL VENTRICULAR TACHYCARDIA (MULTI): ICD-10-CM

## 2024-04-30 DIAGNOSIS — Z95.810 CARDIAC DEFIBRILLATOR IN PLACE: ICD-10-CM

## 2024-04-30 DIAGNOSIS — Z95.810 ICD (IMPLANTABLE CARDIOVERTER-DEFIBRILLATOR) IN PLACE: ICD-10-CM

## 2024-04-30 PROCEDURE — 93283 PRGRMG EVAL IMPLANTABLE DFB: CPT | Performed by: INTERNAL MEDICINE

## 2024-04-30 PROCEDURE — 1036F TOBACCO NON-USER: CPT | Performed by: INTERNAL MEDICINE

## 2024-04-30 PROCEDURE — 99213 OFFICE O/P EST LOW 20 MIN: CPT | Performed by: INTERNAL MEDICINE

## 2024-04-30 PROCEDURE — 1159F MED LIST DOCD IN RCRD: CPT | Performed by: INTERNAL MEDICINE

## 2024-04-30 PROCEDURE — 1160F RVW MEDS BY RX/DR IN RCRD: CPT | Performed by: INTERNAL MEDICINE

## 2024-04-30 NOTE — PROGRESS NOTES
Subjective:  The patient is an 78-year-old white male, followed primarily by Dr. Radha Jang and from a cardiology standpoint by Dr. Rodriguez Clements, who presents for ICD follow-up.  He has a history of coronary artery disease with an old anterior myocardial infarction in 2010, and LAD and RCA PCI's.  His LVEF was as low as 18%, leading to Medtronic DDDR ICD placement in February 2017.  He was in atrial fibrillation at the time of device implantation and subsequently treated with sotalol but was in and out of atrial fibrillation over the next few years.  He required cardioversions in December 2020 and March 2021, and ultimately underwent pulmonary vein isolation by Dr. Bassett in April 2021.  He has done well over the last few years, and his LVEF on optimal medical therapy is up to 38%, I believe.    The patient has retired from managing a senior softball team.  He remains quite active around the house, doing his own yard work, with no significant exertional dyspnea.  He continues on guideline-directed medical therapy, including Toprol XL, Entresto, spironolactone, and Farxiga.  He is also on Pradaxa, given his history of paroxysmal atrial fibrillation.    Current Outpatient Medications   Medication Sig    bismuth subsalicylate (ANTI-DIARRHEAL ORAL) Take by mouth if needed.    coenzyme Q-10 100 mg capsule Take 1 capsule (100 mg) by mouth once daily.    dabigatran etexilate (Pradaxa) 150 mg capsule Take 1 capsule (150 mg) by mouth 2 times a day.    dapagliflozin propanediol (Farxiga) 10 mg Take 1 tablet (10 mg) by mouth once daily.    furosemide (Lasix) 20 mg tablet Take 1 tablet (20 mg) by mouth once daily as needed (swelling).    glucosamine sulfate 1,000 mg capsule Take 2 capsules by mouth once daily.    metoprolol succinate XL (Toprol-XL) 25 mg 24 hr tablet Take 1 tablet (25 mg) by mouth once daily.    multivitamin tablet Take 1 tablet by mouth once daily.    rosuvastatin (Crestor) 20 mg tablet Take 1 tablet (20  mg) by mouth once daily.    sacubitriL-valsartan (Entresto)  mg tablet Take 1 tablet by mouth 2 times a day.    spironolactone (Aldactone) 25 mg tablet Take 0.5 tablets (12.5 mg) by mouth once daily.    vitamin A 2,400 mcg capsule Take 1 capsule (2.4 mg) by mouth once daily.     Allergies:  Patient has no known allergies.     Patient Active Problem List   Diagnosis    Anemia    Atrial flutter (Multi)    Atrial tachycardia, paroxysmal (CMS-HCC)    Chronic combined systolic and diastolic heart failure, NYHA class 2 (Multi)    Chronic diarrhea    Chronic sinusitis    Cogwheel rigidity    COPD (chronic obstructive pulmonary disease) (Multi)    CAD (coronary artery disease)    Erectile dysfunction    Dyslipidemia    Hyponatremia    ICD (implantable cardioverter-defibrillator) in place    Ischemic cardiomyopathy    Knee pain, left    Left inguinal hernia    Macrocytosis without anemia    Monoclonal gammopathy of unknown significance    Nasal obstruction    Nasal septal deviation    PAC (premature atrial contraction)    Paroxysmal atrial fibrillation (Multi)    PVC (premature ventricular contraction)    Right trigger finger    Trigger finger of right thumb    Non-recurrent unilateral inguinal hernia without obstruction or gangrene     Past Surgical History:   Procedure Laterality Date    CARDIAC CATHETERIZATION  01/31/2017    Patent LAD and RCA stents.    CORONARY ANGIOPLASTY WITH STENT PLACEMENT  12/02/2010    PCI to LAD    CORONARY ANGIOPLASTY WITH STENT PLACEMENT  05/19/2011    PCI to RCA    OTHER SURGICAL HISTORY  10/19/2021    Hernia repair    OTHER SURGICAL HISTORY  10/19/2021    Complete colonoscopy    OTHER SURGICAL HISTORY  10/19/2021    Cardioverter defibrillator insertion    OTHER SURGICAL HISTORY  10/19/2021    Tonsillectomy    OTHER SURGICAL HISTORY  10/19/2021    Cataract surgery     Objective:  Vitals:    04/30/24 1426   BP: 110/80   Pulse: 67   Temp: 36.8 °C (98.2 °F)   Height:     1.753 m (5'  "9\")  Weight: 72 kg (158 lb 12.8 oz)     Exam:  Gen: Vivacious gentleman in no distress; alert and oriented; appears younger than stated age.  HEENT: No scleral icterus.  Neck: No jugular venous distention or thyromegaly.  Left subclavian ICD pocket: Normal in appearance.  Lungs: Clear to auscultation, with no wheezes or rales.  Heart: Regular rhythm with slightly diffuse apical impulse but no murmurs.  Abdomen: Benign, with no organomegaly or masses.  Extremities: Intact distal pulses; no edema.  Neuro: No focal neurologic abnormalities.  Skin: No cutaneous lesions.    Device Check:  A Medtronic ICD check was done.  The unit is programmed AAIR (MVP) between 60 bpm and 120 bpm.  This provides nearly 89% atrial pacing but under 1% ventricular pacing.  The lead parameters were good.  The battery longevity is estimated at 1.7 years.  The atrial arrhythmia burden was under 0.1%.  There were no sustained ventricular arrhythmias.    Impressions:  1.  Coronary artery disease, status post old anterior myocardial infarction and subsequent percutaneous interventions.  He was seen in the past by Dr. Jaden Dillon, who left the area a few years ago.  He is now followed by Dr. Rodriguez Clements and is free of anginal symptoms.  2.  Chronic ischemic cardiomyopathy with LVEF 38% but no worse than class II heart failure.  3.  Status post Medtronic DDDR ICD in 2017, with normal device function.  4.  Paroxysmal atrial fibrillation with failure of sotalol therapy in the past.  The patient underwent pulmonary vein isolation in 2021, and has done well since then, with a minuscule burden of atrial arrhythmias.  He has a [id number] score of at least 5 (hypertension, CAD, CHF, 2 points for age over 75), and he is appropriately anticoagulated with generic Pradaxa.  5.  Other medical problems, including controlled hypertension, hyperlipidemia, sinus node dysfunction, and monoclonal gammopathy.    Recommendations:  1.  The patient will " continue his current medical regimen.  2.  His device will be checked remotely every 3 months and he will see me on an annual basis.  3.  With a significant increase in atrial fibrillation burden, particularly symptomatic, I will consider placing him back on sotalol to replace his metoprolol.  4.  He will notify us in the event of any ICD shocks.      Floyd Rob MD

## 2024-05-02 ENCOUNTER — OFFICE VISIT (OUTPATIENT)
Dept: PRIMARY CARE | Facility: CLINIC | Age: 79
End: 2024-05-02
Payer: MEDICARE

## 2024-05-02 VITALS
HEIGHT: 69 IN | DIASTOLIC BLOOD PRESSURE: 61 MMHG | SYSTOLIC BLOOD PRESSURE: 91 MMHG | BODY MASS INDEX: 23.25 KG/M2 | HEART RATE: 63 BPM | WEIGHT: 157 LBS

## 2024-05-02 DIAGNOSIS — Z98.61 S/P PTCA (PERCUTANEOUS TRANSLUMINAL CORONARY ANGIOPLASTY): ICD-10-CM

## 2024-05-02 DIAGNOSIS — D47.2 MONOCLONAL GAMMOPATHY OF UNKNOWN SIGNIFICANCE: ICD-10-CM

## 2024-05-02 DIAGNOSIS — I25.5 ISCHEMIC CARDIOMYOPATHY: ICD-10-CM

## 2024-05-02 DIAGNOSIS — I25.10 CORONARY ARTERY DISEASE INVOLVING NATIVE CORONARY ARTERY OF NATIVE HEART WITHOUT ANGINA PECTORIS: ICD-10-CM

## 2024-05-02 DIAGNOSIS — E87.1 HYPONATREMIA: ICD-10-CM

## 2024-05-02 DIAGNOSIS — R25.1 TREMOR: ICD-10-CM

## 2024-05-02 DIAGNOSIS — I50.42 CHRONIC COMBINED SYSTOLIC AND DIASTOLIC HEART FAILURE, NYHA CLASS 2 (MULTI): ICD-10-CM

## 2024-05-02 DIAGNOSIS — J44.9 CHRONIC OBSTRUCTIVE PULMONARY DISEASE, UNSPECIFIED COPD TYPE (MULTI): ICD-10-CM

## 2024-05-02 DIAGNOSIS — I48.0 PAROXYSMAL ATRIAL FIBRILLATION (MULTI): ICD-10-CM

## 2024-05-02 DIAGNOSIS — E78.00 HYPERCHOLESTEROLEMIA: ICD-10-CM

## 2024-05-02 DIAGNOSIS — Z00.00 ROUTINE GENERAL MEDICAL EXAMINATION AT HEALTH CARE FACILITY: Primary | ICD-10-CM

## 2024-05-02 DIAGNOSIS — Z00.00 WELL ADULT HEALTH CHECK: ICD-10-CM

## 2024-05-02 DIAGNOSIS — Z95.810 ICD (IMPLANTABLE CARDIOVERTER-DEFIBRILLATOR) IN PLACE: ICD-10-CM

## 2024-05-02 PROBLEM — G62.9 PERIPHERAL NERVE DISEASE: Status: ACTIVE | Noted: 2024-05-02

## 2024-05-02 PROBLEM — G25.0 ESSENTIAL TREMOR: Status: ACTIVE | Noted: 2024-05-02

## 2024-05-02 PROBLEM — M19.90 OSTEOARTHRITIS: Status: ACTIVE | Noted: 2023-05-31

## 2024-05-02 PROBLEM — K21.9 GASTROESOPHAGEAL REFLUX DISEASE: Status: ACTIVE | Noted: 2024-05-02

## 2024-05-02 PROBLEM — Z86.69 HISTORY OF HEARING PROBLEM: Status: ACTIVE | Noted: 2024-05-02

## 2024-05-02 PROBLEM — S90.829A: Status: ACTIVE | Noted: 2024-05-02

## 2024-05-02 PROBLEM — S83.209A TEAR OF MENISCUS OF KNEE: Status: ACTIVE | Noted: 2024-05-02

## 2024-05-02 PROBLEM — Z86.39 HISTORY OF METABOLIC DISORDER: Status: ACTIVE | Noted: 2024-05-02
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
## 2024-05-02 PROBLEM — Z86.79 HISTORY OF CARDIAC ARRHYTHMIA: Status: ACTIVE | Noted: 2024-05-02

## 2024-05-02 PROBLEM — I47.29 OTHER VENTRICULAR TACHYCARDIA (MULTI): Status: ACTIVE | Noted: 2023-08-03

## 2024-05-02 PROBLEM — J31.0 RHINITIS: Status: ACTIVE | Noted: 2024-05-02

## 2024-05-02 PROBLEM — E66.3 OVERWEIGHT: Status: ACTIVE | Noted: 2024-05-02

## 2024-05-02 PROBLEM — R74.01 HIGH ALANINE AMINOTRANSFERASE (ALT) LEVEL: Status: ACTIVE | Noted: 2024-05-02

## 2024-05-02 PROBLEM — R20.9 COLD HANDS: Status: ACTIVE | Noted: 2024-05-02

## 2024-05-02 PROCEDURE — 1170F FXNL STATUS ASSESSED: CPT | Performed by: INTERNAL MEDICINE

## 2024-05-02 PROCEDURE — G0439 PPPS, SUBSEQ VISIT: HCPCS | Performed by: INTERNAL MEDICINE

## 2024-05-02 PROCEDURE — 1160F RVW MEDS BY RX/DR IN RCRD: CPT | Performed by: INTERNAL MEDICINE

## 2024-05-02 PROCEDURE — 1036F TOBACCO NON-USER: CPT | Performed by: INTERNAL MEDICINE

## 2024-05-02 PROCEDURE — 1159F MED LIST DOCD IN RCRD: CPT | Performed by: INTERNAL MEDICINE

## 2024-05-02 ASSESSMENT — ACTIVITIES OF DAILY LIVING (ADL)
TAKING_MEDICATION: INDEPENDENT
BATHING: INDEPENDENT
DOING_HOUSEWORK: INDEPENDENT
GROCERY_SHOPPING: INDEPENDENT
DRESSING: INDEPENDENT
MANAGING_FINANCES: INDEPENDENT

## 2024-05-02 ASSESSMENT — PATIENT HEALTH QUESTIONNAIRE - PHQ9
1. LITTLE INTEREST OR PLEASURE IN DOING THINGS: NOT AT ALL
2. FEELING DOWN, DEPRESSED OR HOPELESS: NOT AT ALL
SUM OF ALL RESPONSES TO PHQ9 QUESTIONS 1 AND 2: 0

## 2024-05-02 NOTE — PROGRESS NOTES
"Subjective   Reason for Visit: Ab Kendrick is an 78 y.o. male here for a Medicare Wellness visit.          Reviewed all medications by prescribing practitioner or clinical pharmacist (such as prescriptions, OTCs, herbal therapies and supplements) and documented in the medical record.    HPI  This is a very pleasant 78-year-old gentleman came for an wellness nurse visit and review and refill  His blood pressure repeat show systolic blood pressure is 104 and blood pressure recorded in previous visit was acceptable and he remains asymptomatic on current medication  He is off sotalol as per primary cardiologist and continues to be on metoprolol  He remains well otherwise  Discussed with him about living well  Immunization was reviewed    Patient Care Team:  Nikki Jang MD as PCP - General (Internal Medicine)  Nikki Jang MD as PCP - [employer] PCP  Floyd Rob MD as Cardiologist (Electrophysiology)     Review of Systems otherwise has been negative except he has lost weight because he has a stopped drinking beer and the diarrhea has improved and no longer a problem his sodium also is somewhat improved but repeat blood work is needed  Past medical history reviewed  Social and family history reviewed  Allergies and medications reviewed  Recent labs reviewed  Vital signs reviewed    Objective   Vitals:  BP 91/61 (BP Location: Left arm, Patient Position: Sitting)   Pulse 63   Ht 1.753 m (5' 9\")   Wt 71.2 kg (157 lb)   BMI 23.18 kg/m²       Physical Exam  Systolic blood pressure 104  Heart sounds regular chest clear  Abdomen soft nontender  Neuro awake alert bilaterally symmetrical no peripheral dependent edema    Assessment/Plan   Problem List Items Addressed This Visit       Chronic combined systolic and diastolic heart failure, NYHA class 2 (Multi)    Relevant Orders    Comprehensive Metabolic Panel    COPD (chronic obstructive pulmonary disease) (Multi)    Relevant Orders    CBC " and Auto Differential    CAD (coronary artery disease)    Hyponatremia    Relevant Orders    Comprehensive Metabolic Panel    ICD (implantable cardioverter-defibrillator) in place    Ischemic cardiomyopathy    Monoclonal gammopathy of unknown significance    Relevant Orders    CBC and Auto Differential    Paroxysmal atrial fibrillation (Multi)    S/P PTCA (percutaneous transluminal coronary angioplasty)    Tremor    Relevant Orders    CBC and Auto Differential    TSH with reflex to Free T4 if abnormal    TSH with reflex to Free T4 if abnormal    Well adult health check    Relevant Orders    Comprehensive Metabolic Panel     Other Visit Diagnoses       Routine general medical examination at health care facility    -  Primary    Hypercholesterolemia        Relevant Orders    Lipid Panel    TSH with reflex to Free T4 if abnormal        Condition is stable labs as ordered  Follow-up in 3 to 4 months time

## 2024-05-10 ENCOUNTER — LAB (OUTPATIENT)
Dept: LAB | Facility: LAB | Age: 79
End: 2024-05-10
Payer: MEDICARE

## 2024-05-10 DIAGNOSIS — R25.1 TREMOR: ICD-10-CM

## 2024-05-10 DIAGNOSIS — E78.00 HYPERCHOLESTEROLEMIA: ICD-10-CM

## 2024-05-10 DIAGNOSIS — Z00.00 WELL ADULT HEALTH CHECK: ICD-10-CM

## 2024-05-10 DIAGNOSIS — D47.2 MONOCLONAL GAMMOPATHY OF UNKNOWN SIGNIFICANCE: ICD-10-CM

## 2024-05-10 DIAGNOSIS — J44.9 CHRONIC OBSTRUCTIVE PULMONARY DISEASE, UNSPECIFIED COPD TYPE (MULTI): ICD-10-CM

## 2024-05-10 DIAGNOSIS — I50.42 CHRONIC COMBINED SYSTOLIC AND DIASTOLIC HEART FAILURE, NYHA CLASS 2 (MULTI): ICD-10-CM

## 2024-05-10 DIAGNOSIS — E87.1 HYPONATREMIA: ICD-10-CM

## 2024-05-10 LAB
ALBUMIN SERPL BCP-MCNC: 4.5 G/DL (ref 3.4–5)
ALP SERPL-CCNC: 50 U/L (ref 33–136)
ALT SERPL W P-5'-P-CCNC: 15 U/L (ref 10–52)
ANION GAP SERPL CALC-SCNC: 13 MMOL/L (ref 10–20)
AST SERPL W P-5'-P-CCNC: 22 U/L (ref 9–39)
BASOPHILS # BLD AUTO: 0.03 X10*3/UL (ref 0–0.1)
BASOPHILS NFR BLD AUTO: 0.7 %
BILIRUB SERPL-MCNC: 0.5 MG/DL (ref 0–1.2)
BUN SERPL-MCNC: 19 MG/DL (ref 6–23)
CALCIUM SERPL-MCNC: 9.7 MG/DL (ref 8.6–10.3)
CHLORIDE SERPL-SCNC: 100 MMOL/L (ref 98–107)
CHOLEST SERPL-MCNC: 172 MG/DL (ref 0–199)
CHOLESTEROL/HDL RATIO: 2.5
CO2 SERPL-SCNC: 28 MMOL/L (ref 21–32)
CREAT SERPL-MCNC: 1.1 MG/DL (ref 0.5–1.3)
EGFRCR SERPLBLD CKD-EPI 2021: 68 ML/MIN/1.73M*2
EOSINOPHIL # BLD AUTO: 0.17 X10*3/UL (ref 0–0.4)
EOSINOPHIL NFR BLD AUTO: 4.2 %
ERYTHROCYTE [DISTWIDTH] IN BLOOD BY AUTOMATED COUNT: 12.9 % (ref 11.5–14.5)
GLUCOSE SERPL-MCNC: 95 MG/DL (ref 74–99)
HCT VFR BLD AUTO: 42 % (ref 41–52)
HDLC SERPL-MCNC: 67.5 MG/DL
HGB BLD-MCNC: 13.7 G/DL (ref 13.5–17.5)
IMM GRANULOCYTES # BLD AUTO: 0 X10*3/UL (ref 0–0.5)
IMM GRANULOCYTES NFR BLD AUTO: 0 % (ref 0–0.9)
LDLC SERPL CALC-MCNC: 90 MG/DL
LYMPHOCYTES # BLD AUTO: 0.9 X10*3/UL (ref 0.8–3)
LYMPHOCYTES NFR BLD AUTO: 22.4 %
MCH RBC QN AUTO: 31.9 PG (ref 26–34)
MCHC RBC AUTO-ENTMCNC: 32.6 G/DL (ref 32–36)
MCV RBC AUTO: 98 FL (ref 80–100)
MONOCYTES # BLD AUTO: 0.48 X10*3/UL (ref 0.05–0.8)
MONOCYTES NFR BLD AUTO: 12 %
NEUTROPHILS # BLD AUTO: 2.43 X10*3/UL (ref 1.6–5.5)
NEUTROPHILS NFR BLD AUTO: 60.7 %
NON HDL CHOLESTEROL: 105 MG/DL (ref 0–149)
NRBC BLD-RTO: 0 /100 WBCS (ref 0–0)
PLATELET # BLD AUTO: 209 X10*3/UL (ref 150–450)
POTASSIUM SERPL-SCNC: 5 MMOL/L (ref 3.5–5.3)
PROT SERPL-MCNC: 7.2 G/DL (ref 6.4–8.2)
RBC # BLD AUTO: 4.3 X10*6/UL (ref 4.5–5.9)
SODIUM SERPL-SCNC: 136 MMOL/L (ref 136–145)
TRIGL SERPL-MCNC: 75 MG/DL (ref 0–149)
TSH SERPL-ACNC: 1.98 MIU/L (ref 0.44–3.98)
VLDL: 15 MG/DL (ref 0–40)
WBC # BLD AUTO: 4 X10*3/UL (ref 4.4–11.3)

## 2024-05-10 PROCEDURE — 85025 COMPLETE CBC W/AUTO DIFF WBC: CPT

## 2024-05-10 PROCEDURE — 80061 LIPID PANEL: CPT

## 2024-05-10 PROCEDURE — 84443 ASSAY THYROID STIM HORMONE: CPT

## 2024-05-10 PROCEDURE — 80053 COMPREHEN METABOLIC PANEL: CPT

## 2024-05-10 PROCEDURE — 36415 COLL VENOUS BLD VENIPUNCTURE: CPT

## 2024-05-30 ENCOUNTER — APPOINTMENT (OUTPATIENT)
Dept: CARDIOLOGY | Facility: HOSPITAL | Age: 79
End: 2024-05-30
Payer: MEDICARE

## 2024-05-30 ENCOUNTER — HOSPITAL ENCOUNTER (OUTPATIENT)
Dept: CARDIOLOGY | Facility: HOSPITAL | Age: 79
Discharge: HOME | End: 2024-05-30
Payer: MEDICARE

## 2024-05-30 DIAGNOSIS — I50.42 CHRONIC COMBINED SYSTOLIC AND DIASTOLIC HEART FAILURE, NYHA CLASS 2 (MULTI): ICD-10-CM

## 2024-05-30 PROCEDURE — 93306 TTE W/DOPPLER COMPLETE: CPT | Performed by: INTERNAL MEDICINE

## 2024-05-30 PROCEDURE — 93306 TTE W/DOPPLER COMPLETE: CPT

## 2024-05-31 LAB
AORTIC VALVE MEAN GRADIENT: 9 MMHG
AORTIC VALVE PEAK VELOCITY: 1.98 M/S
AV PEAK GRADIENT: 15.7 MMHG
AVA (PEAK VEL): 1.01 CM2
AVA (VTI): 1.1 CM2
EJECTION FRACTION APICAL 4 CHAMBER: 47.4
LEFT VENTRICLE INTERNAL DIMENSION DIASTOLE: 5.2 CM (ref 3.5–6)
LEFT VENTRICULAR OUTFLOW TRACT DIAMETER: 2 CM
LV EJECTION FRACTION BIPLANE: 44 %
MITRAL VALVE E/A RATIO: 2.97
MITRAL VALVE E/E' RATIO: 10.25
RIGHT VENTRICLE FREE WALL PEAK S': 6 CM/S
RIGHT VENTRICLE PEAK SYSTOLIC PRESSURE: 47.6 MMHG

## 2024-06-05 ENCOUNTER — TELEPHONE (OUTPATIENT)
Dept: PRIMARY CARE | Facility: CLINIC | Age: 79
End: 2024-06-05
Payer: MEDICARE

## 2024-06-05 ENCOUNTER — TELEPHONE (OUTPATIENT)
Dept: CARDIOLOGY | Facility: CLINIC | Age: 79
End: 2024-06-05
Payer: MEDICARE

## 2024-06-05 NOTE — TELEPHONE ENCOUNTER
Pt had bw done on 5/10/24 and never received the results. Please advise pt at [phone]. Thanks

## 2024-06-05 NOTE — TELEPHONE ENCOUNTER
I have called the patient and Andrew has verbalized under standing of instructions. But he want to know if there is a medication that can help with the heart failure and the tremor together.

## 2024-06-05 NOTE — TELEPHONE ENCOUNTER
I have called the patient and Andrew has verbalized under standing of instructions. But he was also wondering if there is medication he can take to help with the ET or EF.

## 2024-06-06 NOTE — TELEPHONE ENCOUNTER
T/c to pt, leiaom, advised pt that his lab results were ok per Dr. Jang, we saw he was able to view these labs using [employer] as well, If he has any questions or concerns he can give the office a call back or message us through [employer].

## 2024-06-24 DIAGNOSIS — I10 PRIMARY HYPERTENSION: ICD-10-CM

## 2024-06-24 DIAGNOSIS — I25.10 CORONARY ARTERY DISEASE, UNSPECIFIED VESSEL OR LESION TYPE, UNSPECIFIED WHETHER ANGINA PRESENT, UNSPECIFIED WHETHER NATIVE OR TRANSPLANTED HEART: ICD-10-CM

## 2024-06-24 RX ORDER — DAPAGLIFLOZIN 10 MG/1
10 TABLET, FILM COATED ORAL DAILY
Qty: 90 TABLET | Refills: 3 | Status: SHIPPED | OUTPATIENT
Start: 2024-06-24 | End: 2025-06-24

## 2024-06-24 RX ORDER — METOPROLOL SUCCINATE 25 MG/1
25 TABLET, EXTENDED RELEASE ORAL DAILY
Qty: 90 TABLET | Refills: 3 | Status: SHIPPED | OUTPATIENT
Start: 2024-06-24 | End: 2025-06-24

## 2024-07-01 ENCOUNTER — TELEPHONE (OUTPATIENT)
Dept: PRIMARY CARE | Facility: CLINIC | Age: 79
End: 2024-07-01
Payer: MEDICARE

## 2024-07-02 DIAGNOSIS — I48.0 PAROXYSMAL ATRIAL FIBRILLATION (MULTI): ICD-10-CM

## 2024-07-02 DIAGNOSIS — Z95.810 S/P ICD (INTERNAL CARDIAC DEFIBRILLATOR) PROCEDURE: ICD-10-CM

## 2024-07-02 DIAGNOSIS — I50.42 CHRONIC COMBINED SYSTOLIC AND DIASTOLIC HEART FAILURE, NYHA CLASS 2 (MULTI): Primary | ICD-10-CM

## 2024-07-02 DIAGNOSIS — I25.10 CORONARY ARTERY DISEASE, UNSPECIFIED VESSEL OR LESION TYPE, UNSPECIFIED WHETHER ANGINA PRESENT, UNSPECIFIED WHETHER NATIVE OR TRANSPLANTED HEART: ICD-10-CM

## 2024-07-02 RX ORDER — DABIGATRAN ETEXILATE 150 MG/1
150 CAPSULE ORAL 2 TIMES DAILY
Qty: 180 CAPSULE | Refills: 3 | Status: SHIPPED | OUTPATIENT
Start: 2024-07-02 | End: 2025-07-02

## 2024-07-02 NOTE — TELEPHONE ENCOUNTER
Spoke with patient to advise his order for disability placard is available to .  Patient verbalized understanding.     1.85

## 2024-08-09 ENCOUNTER — HOSPITAL ENCOUNTER (OUTPATIENT)
Dept: CARDIOLOGY | Facility: HOSPITAL | Age: 79
Discharge: HOME | End: 2024-08-09
Payer: MEDICARE

## 2024-08-09 DIAGNOSIS — Z95.810 PRESENCE OF AUTOMATIC CARDIOVERTER/DEFIBRILLATOR (AICD): ICD-10-CM

## 2024-08-09 DIAGNOSIS — I47.29 VENTRICULAR TACHYCARDIA (PAROXYSMAL) (MULTI): ICD-10-CM

## 2024-08-09 PROCEDURE — 93296 REM INTERROG EVL PM/IDS: CPT

## 2024-08-09 PROCEDURE — 93295 DEV INTERROG REMOTE 1/2/MLT: CPT | Performed by: INTERNAL MEDICINE

## 2024-08-22 ENCOUNTER — APPOINTMENT (OUTPATIENT)
Dept: PRIMARY CARE | Facility: CLINIC | Age: 79
End: 2024-08-22
Payer: MEDICARE

## 2024-08-22 VITALS
HEART RATE: 70 BPM | HEIGHT: 69 IN | WEIGHT: 156.3 LBS | BODY MASS INDEX: 23.15 KG/M2 | SYSTOLIC BLOOD PRESSURE: 99 MMHG | DIASTOLIC BLOOD PRESSURE: 62 MMHG

## 2024-08-22 DIAGNOSIS — I25.5 ISCHEMIC CARDIOMYOPATHY: ICD-10-CM

## 2024-08-22 DIAGNOSIS — G25.0 ESSENTIAL TREMOR: Primary | ICD-10-CM

## 2024-08-22 DIAGNOSIS — I48.0 PAROXYSMAL ATRIAL FIBRILLATION (MULTI): ICD-10-CM

## 2024-08-22 DIAGNOSIS — E78.00 HYPERCHOLESTEROLEMIA: ICD-10-CM

## 2024-08-22 DIAGNOSIS — Z79.01 CHRONIC ANTICOAGULATION: ICD-10-CM

## 2024-08-22 DIAGNOSIS — I25.10 CORONARY ARTERY DISEASE INVOLVING NATIVE CORONARY ARTERY OF NATIVE HEART WITHOUT ANGINA PECTORIS: ICD-10-CM

## 2024-08-22 DIAGNOSIS — Z95.810 ICD (IMPLANTABLE CARDIOVERTER-DEFIBRILLATOR) IN PLACE: ICD-10-CM

## 2024-08-22 DIAGNOSIS — Z00.00 WELL ADULT HEALTH CHECK: ICD-10-CM

## 2024-08-22 PROCEDURE — 1036F TOBACCO NON-USER: CPT | Performed by: INTERNAL MEDICINE

## 2024-08-22 PROCEDURE — 99214 OFFICE O/P EST MOD 30 MIN: CPT | Performed by: INTERNAL MEDICINE

## 2024-08-22 PROCEDURE — 1158F ADVNC CARE PLAN TLK DOCD: CPT | Performed by: INTERNAL MEDICINE

## 2024-08-22 PROCEDURE — 1159F MED LIST DOCD IN RCRD: CPT | Performed by: INTERNAL MEDICINE

## 2024-08-22 PROCEDURE — 1123F ACP DISCUSS/DSCN MKR DOCD: CPT | Performed by: INTERNAL MEDICINE

## 2024-08-22 ASSESSMENT — PATIENT HEALTH QUESTIONNAIRE - PHQ9
2. FEELING DOWN, DEPRESSED OR HOPELESS: NOT AT ALL
SUM OF ALL RESPONSES TO PHQ9 QUESTIONS 1 AND 2: 0
1. LITTLE INTEREST OR PLEASURE IN DOING THINGS: NOT AT ALL

## 2024-08-22 NOTE — PROGRESS NOTES
"Assessment/Plan   Problem List Items Addressed This Visit       CAD (coronary artery disease)    ICD (implantable cardioverter-defibrillator) in place    Ischemic cardiomyopathy    Paroxysmal atrial fibrillation (Multi)    Essential tremor - Primary    Well adult health check    Relevant Orders    Comprehensive Metabolic Panel    Chronic anticoagulation     Other Visit Diagnoses       Hypercholesterolemia        Relevant Orders    Lipid Panel          Conditions stable  Important to note that nail changes are nonspecific  He is going to take some multivitamin for replacement see how he responds  His calcium level is normal  His lipid panel though it is better than his original level of high cholesterol prior to cardiac event nevertheless LDL is not at goal and we discussed in addition to medication dietary aspect as well  I have also ordered a blood test to be done in next 3-month and follow-up  Additionally he will also discuss with the cardiologist whether additional medication will be appropriate  His heart failure is compensated  And no change in the medication has been done  Subjective   Patient ID: Ab Kendrick \"SAM" is a 78 y.o. male who presents for Follow-up.    Past Surgical History:   Procedure Laterality Date    CARDIAC CATHETERIZATION  01/31/2017    Patent LAD and RCA stents.    CORONARY ANGIOPLASTY WITH STENT PLACEMENT  12/02/2010    PCI to LAD    CORONARY ANGIOPLASTY WITH STENT PLACEMENT  05/19/2011    PCI to RCA    OTHER SURGICAL HISTORY  10/19/2021    Hernia repair    OTHER SURGICAL HISTORY  10/19/2021    Complete colonoscopy    OTHER SURGICAL HISTORY  10/19/2021    Cardioverter defibrillator insertion    OTHER SURGICAL HISTORY  10/19/2021    Tonsillectomy    OTHER SURGICAL HISTORY  10/19/2021    Cataract surgery      Family History   Problem Relation Name Age of Onset    Cancer Father      Prostate cancer Brother        Social History     Socioeconomic History    Marital status:      " Spouse name: Not on file    Number of children: Not on file    Years of education: Not on file    Highest education level: Not on file   Occupational History    Not on file   Tobacco Use    Smoking status: Former     Current packs/day: 0.00     Types: Cigarettes     Quit date:      Years since quittin.6    Smokeless tobacco: Never   Vaping Use    Vaping status: Never Used   Substance and Sexual Activity    Alcohol use: Yes     Alcohol/week: 8.0 standard drinks of alcohol     Types: 8 Cans of beer per week     Comment: occasional    Drug use: Never    Sexual activity: Yes   Other Topics Concern    Not on file   Social History Narrative    Not on file     Social Determinants of Health     Financial Resource Strain: Not on file   Food Insecurity: Not on file   Transportation Needs: Not on file   Physical Activity: Not on file   Stress: Not on file   Social Connections: Not on file   Intimate Partner Violence: Not on file   Housing Stability: Not on file      Patient has no known allergies.   Current Outpatient Medications   Medication Sig Dispense Refill    bismuth subsalicylate (ANTI-DIARRHEAL ORAL) Take by mouth if needed.      coenzyme Q-10 100 mg capsule Take 1 capsule (100 mg) by mouth once daily.      dabigatran etexilate (Pradaxa) 150 mg capsule Take 1 capsule (150 mg) by mouth 2 times a day. 180 capsule 3    dapagliflozin propanediol (Farxiga) 10 mg Take 1 tablet (10 mg) by mouth once daily. 90 tablet 3    furosemide (Lasix) 20 mg tablet Take 1 tablet (20 mg) by mouth once daily as needed (swelling).      glucosamine sulfate 1,000 mg capsule Take 2 capsules by mouth once daily.      metoprolol succinate XL (Toprol-XL) 25 mg 24 hr tablet Take 1 tablet (25 mg) by mouth once daily. 90 tablet 3    multivitamin tablet Take 1 tablet by mouth once daily.      rosuvastatin (Crestor) 20 mg tablet Take 1 tablet (20 mg) by mouth once daily.      sacubitriL-valsartan (Entresto)  mg tablet Take 1 tablet by  "mouth 2 times a day. 180 tablet 3    spironolactone (Aldactone) 25 mg tablet Take 0.5 tablets (12.5 mg) by mouth once daily. 45 tablet 3    vitamin A 2,400 mcg capsule Take 1 capsule (2.4 mg) by mouth once daily.       No current facility-administered medications for this visit.      Vitals:    08/22/24 1235   BP: 99/62   BP Location: Left arm   Patient Position: Sitting   Pulse: 70   Weight: 70.9 kg (156 lb 4.8 oz)   Height: 1.753 m (5' 9\")      Problem List Items Addressed This Visit       CAD (coronary artery disease)    ICD (implantable cardioverter-defibrillator) in place    Ischemic cardiomyopathy    Paroxysmal atrial fibrillation (Multi)    Essential tremor - Primary    Well adult health check    Relevant Orders    Comprehensive Metabolic Panel    Chronic anticoagulation     Other Visit Diagnoses       Hypercholesterolemia        Relevant Orders    Lipid Panel           Orders Placed This Encounter   Procedures    Comprehensive Metabolic Panel     Standing Status:   Future     Standing Expiration Date:   8/22/2025     Order Specific Question:   Release result to [facility]     Answer:   Immediate    Lipid Panel     Standing Status:   Future     Standing Expiration Date:   8/22/2025     Order Specific Question:   Release result to MyChart     Answer:   Immediate        HPI  Came for periodic review  Continues to have some tremor especially on action  No fall  No gait abnormality  No chest pain no palpitation  Labs reviewed shared and discussed  He showed the nails with ridging and pitting    ROS  Essentially negative  Past medical history reviewed  Social and family history reviewed  Allergies and medications reviewed  Recent labs reviewed  Vital signs reviewed    PHYSICAL EXAM  Cardiovascular chest abdominal neurological examination normal  Both thumb nails shows vertical ridging and some nail pitting  Labs reviewed shared  Chart reviewed cardiology note reviewed         No results found for: \"PR1\", \"BMPR1A\", " "\"CMPLAS\", \"TQ3UJJCC\", \"KPSAT\"   Lab Results   Component Value Date    CHOL 172 05/10/2024    LDLCALC 90 05/10/2024    CHHDL 2.5 05/10/2024                "

## 2024-08-29 ENCOUNTER — APPOINTMENT (OUTPATIENT)
Dept: PRIMARY CARE | Facility: CLINIC | Age: 79
End: 2024-08-29
Payer: MEDICARE

## 2024-09-05 DIAGNOSIS — E78.5 DYSLIPIDEMIA: Primary | ICD-10-CM

## 2024-09-06 RX ORDER — ROSUVASTATIN CALCIUM 20 MG/1
20 TABLET, COATED ORAL DAILY
Qty: 90 TABLET | Refills: 3 | Status: SHIPPED | OUTPATIENT
Start: 2024-09-06

## 2024-10-18 ENCOUNTER — LAB (OUTPATIENT)
Dept: LAB | Facility: LAB | Age: 79
End: 2024-10-18
Payer: MEDICARE

## 2024-10-18 DIAGNOSIS — Z00.00 WELL ADULT HEALTH CHECK: ICD-10-CM

## 2024-10-18 DIAGNOSIS — E78.00 HYPERCHOLESTEROLEMIA: ICD-10-CM

## 2024-10-18 LAB
ALBUMIN SERPL BCP-MCNC: 4.5 G/DL (ref 3.4–5)
ALP SERPL-CCNC: 56 U/L (ref 33–136)
ALT SERPL W P-5'-P-CCNC: 15 U/L (ref 10–52)
ANION GAP SERPL CALC-SCNC: 15 MMOL/L (ref 10–20)
AST SERPL W P-5'-P-CCNC: 21 U/L (ref 9–39)
BILIRUB SERPL-MCNC: 0.5 MG/DL (ref 0–1.2)
BUN SERPL-MCNC: 18 MG/DL (ref 6–23)
CALCIUM SERPL-MCNC: 9.7 MG/DL (ref 8.6–10.3)
CHLORIDE SERPL-SCNC: 94 MMOL/L (ref 98–107)
CHOLEST SERPL-MCNC: 176 MG/DL (ref 0–199)
CHOLESTEROL/HDL RATIO: 2.6
CO2 SERPL-SCNC: 27 MMOL/L (ref 21–32)
CREAT SERPL-MCNC: 1.11 MG/DL (ref 0.5–1.3)
EGFRCR SERPLBLD CKD-EPI 2021: 67 ML/MIN/1.73M*2
GLUCOSE SERPL-MCNC: 89 MG/DL (ref 74–99)
HDLC SERPL-MCNC: 68.4 MG/DL
LDLC SERPL CALC-MCNC: 92 MG/DL
NON HDL CHOLESTEROL: 108 MG/DL (ref 0–149)
POTASSIUM SERPL-SCNC: 5.3 MMOL/L (ref 3.5–5.3)
PROT SERPL-MCNC: 7.6 G/DL (ref 6.4–8.2)
SODIUM SERPL-SCNC: 131 MMOL/L (ref 136–145)
TRIGL SERPL-MCNC: 77 MG/DL (ref 0–149)
VLDL: 15 MG/DL (ref 0–40)

## 2024-10-18 PROCEDURE — 80053 COMPREHEN METABOLIC PANEL: CPT

## 2024-10-18 PROCEDURE — 80061 LIPID PANEL: CPT

## 2024-10-18 PROCEDURE — 36415 COLL VENOUS BLD VENIPUNCTURE: CPT

## 2024-10-23 NOTE — PROGRESS NOTES
"Chief Complaint:   No chief complaint on file.     History Of Present Illness:    Ab Kendrick \"KENDY\" is a 79 y.o. male with a history of chronic systolic and diastolic heart failure, CAD (anterolateral STEMI 12/2/2010 with PCI to the LAD and staged PCI to the RCA), dyslipidemia, hypertension, ventricular tachycardia with [employer] ICD (MRI safe), chronic atrial fibrillation on long-term oral anticoagulation, aortic stenosis, and pulmonary hypertension here for routine follow-up.     Denies any exertional chest pain or shortness of breath.  Had some blood work last week and he tells me that his sodium level had dropped again.    Continues to remain very active.  Denies any exertional fatigue.      Echocardiogram 5/30/2024: EF 40 to 45%.  Mild to moderate MR and TR.  Mild aortic stenosis.  RVSP 48 mmHg.     Echocardiogram 9/27/2021: EF 38% with moderately dilated LV. Mild left atrial enlargement. ICD/pacer leads noted in the RA and RV. Mild MR and moderate TR. Trace AI. RVSP 57 mmHg.     Catheterization 1/31/2017: Patent LAD and RCA stents. Moderate pulmonary hypertension with elevated LVEDP of 25 mmHg. EF 20%.     PCI to the LAD 12/2/2010 with HUGH. PCI to RCA 5/19/2011      Past Medical History:  He has a past medical history of CAD (coronary artery disease) (08/27/2023), Chronic combined systolic and diastolic heart failure, NYHA class 2 (08/27/2023), Chronic rhinitis, Diverticulosis of intestine, part unspecified, without perforation or abscess without bleeding, HTN (hypertension) (08/27/2023), Hyperlipidemia (08/27/2023), Myocardial infarction (Multi), and Persistent atrial fibrillation (Multi) (08/27/2023).    Past Surgical History:  He has a past surgical history that includes Other surgical history (10/19/2021); Other surgical history (10/19/2021); Other surgical history (10/19/2021); Other surgical history (10/19/2021); Other surgical history (10/19/2021); Coronary angioplasty with stent (12/02/2010); " "Coronary angioplasty with stent (05/19/2011); and Cardiac catheterization (01/31/2017).      Social History:  He reports that he quit smoking about 11 years ago. His smoking use included cigarettes. He has never used smokeless tobacco. He reports current alcohol use of about 8.0 standard drinks of alcohol per week. He reports that he does not use drugs.    Family History:  Family History   Problem Relation Name Age of Onset    Cancer Father      Prostate cancer Brother          Allergies:  Patient has no known allergies.    Outpatient Medications:  Current Outpatient Medications   Medication Instructions    bismuth subsalicylate (ANTI-DIARRHEAL ORAL) As needed    coenzyme Q-10 100 mg, Daily    Comirnaty 2023-24, 12y up,,PF, 30 mcg/0.3 mL suspension     dabigatran etexilate (PRADAXA) 150 mg, oral, 2 times daily    dapagliflozin propanediol (FARXIGA) 10 mg, oral, Daily    furosemide (Lasix) 20 mg tablet 1 tablet, Daily PRN    glucosamine sulfate 1,000 mg capsule 2 capsules, Daily    metoprolol succinate XL (TOPROL-XL) 25 mg, oral, Daily    multivitamin tablet 1 tablet, Daily    rosuvastatin (CRESTOR) 20 mg, oral, Daily    sacubitriL-valsartan (Entresto)  mg tablet 1 tablet, oral, 2 times daily    spironolactone (ALDACTONE) 12.5 mg, oral, Daily    vitamin A 8,000 Units, Daily       Last Recorded Vitals:  Visit Vitals  /80 (BP Location: Left arm, Patient Position: Sitting)   Pulse 85   Ht 1.753 m (5' 9\")   Wt 70.9 kg (156 lb 4.8 oz)   SpO2 95%   BMI 23.08 kg/m²   Smoking Status Former   BSA 1.86 m²      LASTWT(3):   Wt Readings from Last 3 Encounters:   10/24/24 70.9 kg (156 lb 4.8 oz)   08/22/24 70.9 kg (156 lb 4.8 oz)   05/02/24 71.2 kg (157 lb)       Physical Exam:  HEENT: Carotid upstrokes normal with referred bilateral heart murmur. JVP is normal.  Pulmonary: Clear to auscultation bilaterally.  Cardiovascular: S1, S2, regular. II/VI early peaking crescendo decrescendo murmur at the right upper sternal " "border. No rubs or gallops.   Lower extremities: Warm. 2+ distal pulses. No edema.     Last Labs:  CBC -  Recent Labs     05/10/24  1142 01/03/24  0916 08/18/23  1219   WBC 4.0* 4.5 3.9*   HGB 13.7 14.4 13.3*   HCT 42.0 45.1 41.6    241 229   MCV 98 99 98       CMP -  Recent Labs     10/18/24  1106 05/10/24  1142 01/03/24  0916 07/16/21  1142 04/16/21  1133 03/18/21  0703 03/18/21  0647 12/31/20  0943   * 136 134*   < > 131*  --    < > 134*   K 5.3 5.0 4.7   < > 5.6*  --    < > 4.3   CL 94* 100 97*   < > 95*  --    < > 98   CO2 27 28 29   < > 31  --    < > 30   ANIONGAP 15 13 13   < > 11  --    < > 10   BUN 18 19 17   < > 17  --    < > 21   CREATININE 1.11 1.10 1.04   < > 1.07  --    < > 1.10   EGFR 67 68 73  --   --   --   --   --    MG  --   --   --   --  1.90 1.80  --  1.90    < > = values in this interval not displayed.     Recent Labs     10/18/24  1106 05/10/24  1142 08/18/23  1219   ALBUMIN 4.5 4.5 4.4   ALKPHOS 56 50 47   ALT 15 15 16   AST 21 22 24   BILITOT 0.5 0.5 0.4       LIPID PANEL -   Recent Labs     10/18/24  1106 05/10/24  1142 08/18/23  1219 04/20/23  1031 07/01/22  1205   CHOL 176 172 163 177 173   LDLCALC 92 90  --   --   --    LDLF  --   --  83 94 87   HDL 68.4 67.5 63.3 69.4 73.0   TRIG 77 75 83 69 66       No results for input(s): \"BNP\", \"HGBA1C\" in the last 02065 hours.        Assessment/Plan   1) chronic combined systolic and diastolic heart failure: He is euvolemic.  EF is 40 to 45%.  Is on goal-directed medical therapy with Entresto, spironolactone, metoprolol succinate, and Farxiga.  Would continue these current doses.     2) CAD: No signs or symptoms to suggest progression of underlying coronary disease. Based on the ISCHEMIA Trial there is no need for routine stress testing.     3) dyslipidemia: Continue with rosuvastatin 20 mg daily for now.  Can consider up titration in the future.     4) paroxysmal atrial fibrillation: Continue Pradaxa for long-term oral anticoagulation " and metoprolol succinate for rate control.     5) erectile dysfunction: Prescription for Viagra given.     6) aortic stenosis.  Mild by most recent echocardiogram.  Would recommend continuing to observe.    7) follow-up: 6 months or sooner if needed       Rodriguez Clements MD

## 2024-10-24 ENCOUNTER — APPOINTMENT (OUTPATIENT)
Dept: CARDIOLOGY | Facility: CLINIC | Age: 79
End: 2024-10-24
Payer: MEDICARE

## 2024-10-24 VITALS
HEIGHT: 69 IN | BODY MASS INDEX: 23.15 KG/M2 | SYSTOLIC BLOOD PRESSURE: 120 MMHG | HEART RATE: 85 BPM | OXYGEN SATURATION: 95 % | DIASTOLIC BLOOD PRESSURE: 80 MMHG | WEIGHT: 156.3 LBS

## 2024-10-24 DIAGNOSIS — E78.5 DYSLIPIDEMIA: ICD-10-CM

## 2024-10-24 DIAGNOSIS — I48.0 PAROXYSMAL ATRIAL FIBRILLATION (MULTI): ICD-10-CM

## 2024-10-24 DIAGNOSIS — I50.42 CHRONIC COMBINED SYSTOLIC AND DIASTOLIC HEART FAILURE, NYHA CLASS 2: Primary | ICD-10-CM

## 2024-10-24 DIAGNOSIS — I25.10 CORONARY ARTERY DISEASE, UNSPECIFIED VESSEL OR LESION TYPE, UNSPECIFIED WHETHER ANGINA PRESENT, UNSPECIFIED WHETHER NATIVE OR TRANSPLANTED HEART: ICD-10-CM

## 2024-10-24 DIAGNOSIS — I25.10 CORONARY ARTERY DISEASE INVOLVING NATIVE CORONARY ARTERY OF NATIVE HEART WITHOUT ANGINA PECTORIS: ICD-10-CM

## 2024-10-24 DIAGNOSIS — N52.9 ERECTILE DISORDER: ICD-10-CM

## 2024-10-24 PROCEDURE — 1036F TOBACCO NON-USER: CPT | Performed by: INTERNAL MEDICINE

## 2024-10-24 PROCEDURE — 1159F MED LIST DOCD IN RCRD: CPT | Performed by: INTERNAL MEDICINE

## 2024-10-24 PROCEDURE — 1123F ACP DISCUSS/DSCN MKR DOCD: CPT | Performed by: INTERNAL MEDICINE

## 2024-10-24 PROCEDURE — 99214 OFFICE O/P EST MOD 30 MIN: CPT | Performed by: INTERNAL MEDICINE

## 2024-10-24 PROCEDURE — 1160F RVW MEDS BY RX/DR IN RCRD: CPT | Performed by: INTERNAL MEDICINE

## 2024-10-24 RX ORDER — SILDENAFIL 50 MG/1
50 TABLET, FILM COATED ORAL DAILY PRN
Qty: 20 TABLET | Refills: 3 | Status: SHIPPED | OUTPATIENT
Start: 2024-10-24 | End: 2024-11-23

## 2024-10-24 RX ORDER — SACUBITRIL AND VALSARTAN 97; 103 MG/1; MG/1
1 TABLET, FILM COATED ORAL 2 TIMES DAILY
Qty: 180 TABLET | Refills: 3 | Status: SHIPPED | OUTPATIENT
Start: 2024-10-24 | End: 2025-10-24

## 2024-10-24 RX ORDER — COVID-19 VACCINE, MRNA 0.05 MG/.48ML
INJECTION, SUSPENSION INTRAMUSCULAR
COMMUNITY
Start: 2023-12-07

## 2024-10-24 RX ORDER — DAPAGLIFLOZIN 10 MG/1
10 TABLET, FILM COATED ORAL DAILY
Qty: 90 TABLET | Refills: 3 | Status: SHIPPED | OUTPATIENT
Start: 2024-10-24 | End: 2025-10-24

## 2024-10-29 DIAGNOSIS — I50.42 CHRONIC COMBINED SYSTOLIC AND DIASTOLIC HEART FAILURE, NYHA CLASS 2: ICD-10-CM

## 2024-10-29 RX ORDER — SACUBITRIL AND VALSARTAN 97; 103 MG/1; MG/1
1 TABLET, FILM COATED ORAL 2 TIMES DAILY
Qty: 180 TABLET | Refills: 3 | Status: SHIPPED | OUTPATIENT
Start: 2024-10-29 | End: 2025-10-29

## 2024-11-18 ENCOUNTER — HOSPITAL ENCOUNTER (OUTPATIENT)
Dept: CARDIOLOGY | Facility: HOSPITAL | Age: 79
Discharge: HOME | End: 2024-11-18
Payer: MEDICARE

## 2024-11-18 DIAGNOSIS — I47.10 SUPRAVENTRICULAR TACHYCARDIA (CMS-HCC): ICD-10-CM

## 2024-11-18 DIAGNOSIS — Z95.810 PRESENCE OF AUTOMATIC CARDIOVERTER/DEFIBRILLATOR (AICD): ICD-10-CM

## 2024-11-18 PROCEDURE — 93295 DEV INTERROG REMOTE 1/2/MLT: CPT | Performed by: INTERNAL MEDICINE

## 2024-11-18 PROCEDURE — 93296 REM INTERROG EVL PM/IDS: CPT

## 2024-12-17 ENCOUNTER — OFFICE VISIT (OUTPATIENT)
Dept: PRIMARY CARE | Facility: CLINIC | Age: 79
End: 2024-12-17
Payer: MEDICARE

## 2024-12-17 VITALS — DIASTOLIC BLOOD PRESSURE: 62 MMHG | SYSTOLIC BLOOD PRESSURE: 96 MMHG

## 2024-12-17 DIAGNOSIS — I48.0 PAROXYSMAL ATRIAL FIBRILLATION (MULTI): ICD-10-CM

## 2024-12-17 DIAGNOSIS — M65.312 TRIGGER FINGER OF LEFT THUMB: Primary | ICD-10-CM

## 2024-12-17 DIAGNOSIS — I50.42 CHRONIC COMBINED SYSTOLIC AND DIASTOLIC HEART FAILURE, NYHA CLASS 2: ICD-10-CM

## 2024-12-17 PROCEDURE — 1123F ACP DISCUSS/DSCN MKR DOCD: CPT | Performed by: INTERNAL MEDICINE

## 2024-12-17 PROCEDURE — 1036F TOBACCO NON-USER: CPT | Performed by: INTERNAL MEDICINE

## 2024-12-17 PROCEDURE — 99213 OFFICE O/P EST LOW 20 MIN: CPT | Performed by: INTERNAL MEDICINE

## 2024-12-17 PROCEDURE — G2211 COMPLEX E/M VISIT ADD ON: HCPCS | Performed by: INTERNAL MEDICINE

## 2024-12-17 PROCEDURE — 1159F MED LIST DOCD IN RCRD: CPT | Performed by: INTERNAL MEDICINE

## 2024-12-17 RX ORDER — METHYLPREDNISOLONE 4 MG/1
TABLET ORAL
Qty: 21 TABLET | Refills: 0 | Status: SHIPPED | OUTPATIENT
Start: 2024-12-17 | End: 2024-12-23

## 2024-12-17 ASSESSMENT — ENCOUNTER SYMPTOMS
OCCASIONAL FEELINGS OF UNSTEADINESS: 0
LOSS OF SENSATION IN FEET: 0
DEPRESSION: 0

## 2024-12-17 NOTE — PROGRESS NOTES
Internal Medicine Outpatient Visit  Chief Complaint   Patient presents with    Trigger Finger     Trigger finger, left hand thumb x 5 weeks.        HPI: Ab Kendrcik is of 79 y.o. who is here for Internal Visit for the following issues:  Patient is seen office today for chief complaint of trigger finger involving the left thumb.  He had trigger finger in the other hand before.  He informs me that he had a good response with Medrol Dosepak.  He wants the Medrol Dosepak again.  Patient is reluctant to see any hand surgeons.  He has seen somebody in the past.  He has no joint swelling or pain has no constitutional symptoms.  Has no chest pain or palpitation.  Has no shortness of breath.  Review of other systems are negative.    Past Surgical History:   Procedure Laterality Date    CARDIAC CATHETERIZATION  01/31/2017    Patent LAD and RCA stents.    CORONARY ANGIOPLASTY WITH STENT PLACEMENT  12/02/2010    PCI to LAD    CORONARY ANGIOPLASTY WITH STENT PLACEMENT  05/19/2011    PCI to RCA    OTHER SURGICAL HISTORY  10/19/2021    Hernia repair    OTHER SURGICAL HISTORY  10/19/2021    Complete colonoscopy    OTHER SURGICAL HISTORY  10/19/2021    Cardioverter defibrillator insertion    OTHER SURGICAL HISTORY  10/19/2021    Tonsillectomy    OTHER SURGICAL HISTORY  10/19/2021    Cataract surgery       Family History   Problem Relation Name Age of Onset    Cancer Father      Prostate cancer Brother           Current Outpatient Medications on File Prior to Visit   Medication Sig Dispense Refill    bismuth subsalicylate (ANTI-DIARRHEAL ORAL) Take by mouth if needed.      coenzyme Q-10 100 mg capsule Take 1 capsule (100 mg) by mouth once daily.      Comirnaty 2023-24, 12y up,,PF, 30 mcg/0.3 mL suspension       dabigatran etexilate (Pradaxa) 150 mg capsule Take 1 capsule (150 mg) by mouth 2 times a day. 180 capsule 3    dapagliflozin propanediol (Farxiga) 10 mg Take 1 tablet (10 mg) by mouth once daily. 90 tablet 3    furosemide  (Lasix) 20 mg tablet Take 1 tablet (20 mg) by mouth once daily as needed (swelling).      glucosamine sulfate 1,000 mg capsule Take 2 capsules by mouth once daily.      metoprolol succinate XL (Toprol-XL) 25 mg 24 hr tablet Take 1 tablet (25 mg) by mouth once daily. 90 tablet 3    multivitamin tablet Take 1 tablet by mouth once daily.      rosuvastatin (Crestor) 20 mg tablet Take 1 tablet (20 mg) by mouth once daily. 90 tablet 3    sacubitriL-valsartan 97 mg-103 mg (Entresto)  mg tablet Take 1 tablet by mouth 2 times a day. 180 tablet 3    spironolactone (Aldactone) 25 mg tablet Take 0.5 tablets (12.5 mg) by mouth once daily. 45 tablet 3    vitamin A 2,400 mcg capsule Take 1 capsule (2.4 mg) by mouth once daily.      sildenafil (Viagra) 50 mg tablet Take 1 tablet (50 mg) by mouth once daily as needed for erectile dysfunction. 20 tablet 3     No current facility-administered medications on file prior to visit.       Blood pressure 96/62.  There is no height or weight on file to calculate BMI.  General examination: There is no acute discomfort  Eyes: There is no pallor or jaundice  Lungs: Clear to auscultation  CVs: Heart sounds are regular soft systolic murmur is heard in upper parasternal area  Examination of hand trigger finger involving the left thumb is noted.  There is also some deformity of the fingers from previous injury.    Assessment and plan:    1. Trigger finger of left thumb (Primary)  Medrol Dosepak is prescribed as requested by the patient as he has good response in the past.  I also offered to refer him to the hand surgeon however the patient declined  - methylPREDNISolone (Medrol Dospak) 4 mg tablets; Take as directed on package.  Dispense: 21 tablet; Refill: 0    2. Paroxysmal atrial fibrillation (Multi)  Patient is anticoagulated.    3. Chronic combined systolic and diastolic heart failure, NYHA class 2  Clinically compensated.  He is being followed by his cardiologist   Starr.

## 2025-01-02 DIAGNOSIS — I25.10 CORONARY ARTERY DISEASE, UNSPECIFIED VESSEL OR LESION TYPE, UNSPECIFIED WHETHER ANGINA PRESENT, UNSPECIFIED WHETHER NATIVE OR TRANSPLANTED HEART: ICD-10-CM

## 2025-01-03 RX ORDER — DAPAGLIFLOZIN 10 MG/1
10 TABLET, FILM COATED ORAL DAILY
Qty: 90 TABLET | Refills: 3 | Status: SHIPPED | OUTPATIENT
Start: 2025-01-03 | End: 2026-01-03

## 2025-01-06 ENCOUNTER — APPOINTMENT (OUTPATIENT)
Dept: NEUROLOGY | Facility: CLINIC | Age: 80
End: 2025-01-06
Payer: MEDICARE

## 2025-01-06 VITALS
WEIGHT: 156 LBS | SYSTOLIC BLOOD PRESSURE: 121 MMHG | DIASTOLIC BLOOD PRESSURE: 79 MMHG | HEIGHT: 69 IN | HEART RATE: 72 BPM | BODY MASS INDEX: 23.11 KG/M2

## 2025-01-06 DIAGNOSIS — G25.0 ESSENTIAL TREMOR: Primary | ICD-10-CM

## 2025-01-06 PROCEDURE — 1036F TOBACCO NON-USER: CPT | Performed by: PSYCHIATRY & NEUROLOGY

## 2025-01-06 PROCEDURE — 99213 OFFICE O/P EST LOW 20 MIN: CPT | Mod: GC | Performed by: PSYCHIATRY & NEUROLOGY

## 2025-01-06 PROCEDURE — 1123F ACP DISCUSS/DSCN MKR DOCD: CPT | Performed by: PSYCHIATRY & NEUROLOGY

## 2025-01-06 PROCEDURE — G2211 COMPLEX E/M VISIT ADD ON: HCPCS | Performed by: PSYCHIATRY & NEUROLOGY

## 2025-01-06 PROCEDURE — 1159F MED LIST DOCD IN RCRD: CPT | Performed by: PSYCHIATRY & NEUROLOGY

## 2025-01-06 PROCEDURE — 99213 OFFICE O/P EST LOW 20 MIN: CPT | Performed by: PSYCHIATRY & NEUROLOGY

## 2025-01-06 ASSESSMENT — FAHN TOLOSA MARTIN TREMOR (FTM)
UE ARMS OUTSTRECHED WRISTS MILDLY EXTENDED FINGERS SPREAD APART: 1
FACE TOTAL: 0
TOUNGE WHEN PROTUDED: 0
LE AT REST: 0
VOICE IN ACTION: 1
LE LEG FLEXED AT HIPS AND KNEES AT POSTURE: 0
LLE TOTAL SCORE: 0
LE TOE TO FINGER IN A FLEXED POSTURE: 0
FACE AT POSTURE WHEN STANDING OR SITTING: 0
UE ARM AT REST: 0
TRUNK  WHEN SITTING OR STANDING: 0
DRAWING A LEFT SCORE: 0
HEAD AT POSTURE WHEN STANDING OR SITTING: 1
RLE TOTAL SCORE: 0
HEAD TOTAL SCORE: 1
UE ARM AT REST: 0
RUE TOTAL SCORE: 1
LE TOE TO FINGER IN A FLEXED POSTURE: 0
PART A SUBTOTAL SCORE: 5
DRAWING A TOTAL SCORE: 1
TRUNK TOTAL SCORE: 0
TOUNGE TOTAL SCORE: 0
UE ARMS OUTSTRECHED WRISTS MILDLY EXTENDED FINGERS SPREAD APART: 0
FACE IN REPOSE: 0
DRAWING B TOTAL SCORE: 3
VOICE TOTAL SCORE: 1
TOUNGE AT REST: 0
DRAWING C LEFT SCORE: 1
HANDWRITING WITH DOMINANT HAND: 1
LE LEG FLEXED AT HIPS AND KNEES AT POSTURE: 0
HEAD IN REPOSE: 0
LE AT REST: 0
UE FINGER TO NOSE AND OTHER ACTIONS: 1
LUE TOTAL SCORE: 2
TRUNK IN REPOSE: 0
DRAWING B RGHT SCORE: 2
DRAWING B LEFT SCORE: 1
DRAWING A RIGHT SCORE: 1
UE FINGER TO NOSE AND OTHER ACTIONS: 1

## 2025-01-06 ASSESSMENT — UNIFIED PARKINSONS DISEASE RATING SCALE (UPDRS)
TOETAPPING_RIGHT: 0
PRONATION_SUPINATION_LEFT: 0
POSTURE: 1
SPONTANEITY_OF_MOVEMENT: 0
FINGER_TAPPING_RIGHT: 0
PARKINSONS_MEDS: NO
RIGIDITY_LUE: 0
POSTURAL_TREMOR_LEFTHAND: 1
POSTURAL_TREMOR_RIGHTHAND: 0
RIGIDITY_RLE: 0
AMPLITUDE_LIP_JAW: 0
KINETIC_TREMOR_RIGHTHAND: 1
FACIAL_EXPRESSION: 0
DYSKINESIAS_PRESENT: NO
SPEECH: 0
LEG_AGILITY_LEFT: 0
AMPLITUDE_RUE: 0
AMPLITUDE_RLE: 0
CHAIR_RISING_SCALE: 0
LEVODOPA: NO
CONSTANCY_TREMOR_ATREST: 0
GAIT: 0
POSTURAL_STABILITY: 0
TOTAL_SCORE: 7
AMPLITUDE_LLE: 0
KINETIC_TREMOR_LEFTHAND: 1
TOETAPPING_LEFT: 0
RIGIDITY_NECK: 0
HANDMOVEMENTS_RIGHT: 0
FREEZING_GAIT: 0
RIGIDITY_LLE: 0
FINGER_TAPPING_LEFT: 1
PRONATION_SUPINATION_RIGHT: 0
LEG_AGILITY_RIGHT: 0
RIGIDITY_RUE: 0
AMPLITUDE_LUE: 0

## 2025-01-06 ASSESSMENT — PATIENT HEALTH QUESTIONNAIRE - PHQ9
1. LITTLE INTEREST OR PLEASURE IN DOING THINGS: NOT AT ALL
SUM OF ALL RESPONSES TO PHQ9 QUESTIONS 1 AND 2: 0
2. FEELING DOWN, DEPRESSED OR HOPELESS: NOT AT ALL
SUM OF ALL RESPONSES TO PHQ9 QUESTIONS 1 & 2: 0
2. FEELING DOWN, DEPRESSED OR HOPELESS: NOT AT ALL
1. LITTLE INTEREST OR PLEASURE IN DOING THINGS: NOT AT ALL

## 2025-01-06 ASSESSMENT — ENCOUNTER SYMPTOMS
LOSS OF SENSATION IN FEET: 0
DEPRESSION: 0
OCCASIONAL FEELINGS OF UNSTEADINESS: 0

## 2025-01-06 NOTE — PROGRESS NOTES
Subjective     Ab eKndrick is a left handed  79 y.o. year old male who presents with Follow-up (9 MO FU). Patient is accompanied by: spouse  Visit type: follow up visit     Tremors remain unchanged from prior: continues to have difficulty w hand control and dexterity. Tremors of both hands remain isolated to action only. Handwriting is messy, but not smaller. Continues to eat at restaurants and participate in social activities. Can do most fine motor activity with extra time. Can hold a cup coffee fine, does not spill. Uses two hands when using a mouse on PC. Shaving- uses electrical razor.   There is no voice tremor. No voice or leg tremors. Patient endorsed feeling a little anxious but not interested in Psychiatry evaluation at this time.    Agrevating factors; none  Relieving factors etoh.      NO rest tremor, there is no stiffness or slowness of movements.   No changes in gait, no shuffle.   Mild softness of voice, maybe more raspy.   Balance: feels a little dizzy sometimes, especially if gets up quickly. No falls.      Non motor:   No hyposmia  denies constipation and urinary issues.   Some tightness when swallowing. No choking or food going down wrong pipe. Takes smaller bites.   Sleep is good. Very occasionally has nightmares, and will kick or shout. Happens once a month   Memory is good.      He loves to garden, he works out 3 times a week, does weights etc.      Family hx:   father was a physician whom gave up surgery due to tremors.     Patient Active Problem List   Diagnosis    Anemia    Atrial flutter (Multi)    Atrial tachycardia, paroxysmal (CMS-HCC)    Chronic combined systolic and diastolic heart failure, NYHA class 2    Chronic diarrhea    Chronic sinusitis    Cogwheel rigidity    COPD (chronic obstructive pulmonary disease) (Multi)    CAD (coronary artery disease)    Erectile dysfunction    Dyslipidemia    Hyponatremia    ICD (implantable cardioverter-defibrillator) in place    Ischemic  cardiomyopathy    Knee pain, left    Left inguinal hernia    Macrocytosis without anemia    Monoclonal gammopathy of unknown significance    Nasal obstruction    Nasal septal deviation    PAC (premature atrial contraction)    Paroxysmal atrial fibrillation (Multi)    PVC (premature ventricular contraction)    Right trigger finger    Trigger finger of right thumb    Non-recurrent unilateral inguinal hernia without obstruction or gangrene    Accidental fall    Cold hands    Essential tremor    Friction blisters of the soles    Gastroesophageal reflux disease    High alanine aminotransferase (ALT) level    History of cardiac arrhythmia    History of hearing problem    History of metabolic disorder    Osteoarthritis    Overweight    Palpitations    Peripheral nerve disease    Rhinitis    S/P ICD (internal cardiac defibrillator) procedure    S/P PTCA (percutaneous transluminal coronary angioplasty)    ST elevation myocardial infarction (STEMI) of anterolateral wall (Multi)    Tear of meniscus of knee    Tremor    Other ventricular tachycardia    Well adult health check    Chronic anticoagulation      Past Medical History:   Diagnosis Date    CAD (coronary artery disease) 08/27/2023    Chronic combined systolic and diastolic heart failure, NYHA class 2 08/27/2023    Chronic rhinitis     Rhinitis    Diverticulosis of intestine, part unspecified, without perforation or abscess without bleeding     Diverticulosis    HTN (hypertension) 08/27/2023    Hyperlipidemia 08/27/2023    Myocardial infarction (Multi)     Persistent atrial fibrillation (Multi) 08/27/2023      Past Surgical History:   Procedure Laterality Date    CARDIAC CATHETERIZATION  01/31/2017    Patent LAD and RCA stents.    CORONARY ANGIOPLASTY WITH STENT PLACEMENT  12/02/2010    PCI to LAD    CORONARY ANGIOPLASTY WITH STENT PLACEMENT  05/19/2011    PCI to RCA    OTHER SURGICAL HISTORY  10/19/2021    Hernia repair    OTHER SURGICAL HISTORY  10/19/2021    Complete  colonoscopy    OTHER SURGICAL HISTORY  10/19/2021    Cardioverter defibrillator insertion    OTHER SURGICAL HISTORY  10/19/2021    Tonsillectomy    OTHER SURGICAL HISTORY  10/19/2021    Cataract surgery      Social History     Socioeconomic History    Marital status:      Spouse name: Not on file    Number of children: Not on file    Years of education: Not on file    Highest education level: Not on file   Occupational History    Not on file   Tobacco Use    Smoking status: Former     Current packs/day: 0.00     Types: Cigarettes     Quit date:      Years since quittin.0    Smokeless tobacco: Never   Vaping Use    Vaping status: Never Used   Substance and Sexual Activity    Alcohol use: Yes     Alcohol/week: 8.0 standard drinks of alcohol     Types: 8 Cans of beer per week     Comment: occasional    Drug use: Never    Sexual activity: Yes   Other Topics Concern    Not on file   Social History Narrative    Not on file     Social Drivers of Health     Financial Resource Strain: Not on file   Food Insecurity: Not on file   Transportation Needs: Not on file   Physical Activity: Not on file   Stress: Not on file   Social Connections: Not on file   Intimate Partner Violence: Not on file   Housing Stability: Not on file      Family History   Problem Relation Name Age of Onset    Cancer Father      Prostate cancer Brother        Current Outpatient Medications on File Prior to Visit   Medication Sig Dispense Refill    bismuth subsalicylate (ANTI-DIARRHEAL ORAL) Take by mouth if needed.      coenzyme Q-10 100 mg capsule Take 1 capsule (100 mg) by mouth once daily.      Comirnaty -, 12y up,,PF, 30 mcg/0.3 mL suspension       dabigatran etexilate (Pradaxa) 150 mg capsule Take 1 capsule (150 mg) by mouth 2 times a day. 180 capsule 3    dapagliflozin propanediol (Farxiga) 10 mg Take 1 tablet (10 mg) by mouth once daily. 90 tablet 3    furosemide (Lasix) 20 mg tablet Take 1 tablet (20 mg) by mouth once daily  as needed (swelling).      glucosamine sulfate 1,000 mg capsule Take 2 capsules by mouth once daily.      metoprolol succinate XL (Toprol-XL) 25 mg 24 hr tablet Take 1 tablet (25 mg) by mouth once daily. 90 tablet 3    multivitamin tablet Take 1 tablet by mouth once daily.      rosuvastatin (Crestor) 20 mg tablet Take 1 tablet (20 mg) by mouth once daily. 90 tablet 3    sacubitriL-valsartan 97 mg-103 mg (Entresto)  mg tablet Take 1 tablet by mouth 2 times a day. 180 tablet 3    sildenafil (Viagra) 50 mg tablet Take 1 tablet (50 mg) by mouth once daily as needed for erectile dysfunction. 20 tablet 3    spironolactone (Aldactone) 25 mg tablet Take 0.5 tablets (12.5 mg) by mouth once daily. 45 tablet 3    vitamin A 2,400 mcg capsule Take 1 capsule (2.4 mg) by mouth once daily.      [DISCONTINUED] dapagliflozin propanediol (Farxiga) 10 mg Take 1 tablet (10 mg) by mouth once daily. 90 tablet 3     No current facility-administered medications on file prior to visit.      No Known Allergies     Objective   Vitals:    01/06/25 1301   BP: 121/79   Pulse: 72      Neurological Exam:  MENTAL STATUS:  General appearance: alert, NAD  Orientation: person, place, time  Language: Expression, repetition, naming, comprehension intact  Follows complex commands across midline    CRANIAL NERVES:  - III, IV, VI: EOMI to pursuit without nystagmus  - VII: Face muscles symmetric with smile  - VIII: hard of hearing  - IX, X: Palate elevated symmetrically bilaterally, no hoarseness  - XI: 5/5 strength on shoulder shrugging bilaterally  - XII: Tongue midline without atrophy or fasciculation    MOTOR: Tone and bulk normal in all extremities    STRENGTH:  R L  Deltoid  5 5  Biceps  5 5  Triceps  5 5     5 5    Hip flexion  5 5  Quadriceps  5 5  Hamstrings  5 5    COORDINATION: Intact on finger to nose bl    GAIT: Normal standard gait     MDS UPDRS 1st Score: Motor Examination  Is the patient on medication for treating the symptoms of  Parkinson's Disease?: No  Is the patient on Levodopa?: No  Speech: 0  Facial Expression: 0  Rigidty Neck: 0  Rigidty RUE: 0  Rigidity - LUE: 0  Rigidity RLE: 0  Rigidity LLE: 0  Finger Tapping Right Hand: 0  Finger Tapping Left Hand: 1  Hand Movements- Right Hand: 0  Hand Movements- Left Hand: 2  Pronatiaon-Supination Movments - Right Hand: 0  Pronatiaon-Supination Movments Left Hand: 0  Toe Tapping Right Foot: 0  Toe Tapping - Left Foot: 0  Leg Agility - Right Le  Leg Agility - Left le  Arising from Chair: 0  Gait: 0  Freezing of Gait: 0  Postural Stability: 0  Posture: 1  Global Spontanteity of Movment ( Body Bradykinesia): 0  Postural Tremor - Right Hand: 0  Postural Tremor - Left hand: 1  Kinetic Tremor - Right hand: 1  Kinetic Tremor - Left hand: 1  Rest Tremor Amplitude - RUE: 0  Rest Tremor Amplitude - LUE: 0  Rest Tremor Amplitude - RLE: 0  Rest Tremor Amplitude - LLE: 0  Rest Tremor Amplitude - Lip/Jaw: 0  Constancy of Rest Tremor: 0  MDS UPDRS Total Score: 7  Were dyskinesias (chorea or dystonia) present during examination?: No    Fahn Bobby Jain tremor rating scale:  PART A Face at rest: 0, Face at posture: 0, Face Total: 0, Tongue at rest: 0, Tongue at posture: 0, Tongue total: 0, Voice in action: 1, Voice total: 1, Head at rest: 0, Head at posture: 1, Head total: 1, RUE at rest: 0, RUE at posture: 0, RUE in action: 1, RUE total: 1, LUE at rest: 0, LUE at posture: 1, LUE in action: 1, LUE total: 2, Trunk at rest: 0, Trunk at posture: 0, Trunk total: 0, RLE at rest: 0, RLE at posture: 0, RLE in action: 0, RLE total: 0, LLE at rest: 0, LLE at posture: 0, LLE in action: 0, LLE total: 0, Part A subtotal: 5 PART B Handwriting dominant: 1, Drawing A - Right: 1, Drawing A - Left: 0, Drawing A total: 1, Drawing B - Right: 2, Drawing B - Left: 1, Drawing B total: 3, Drawing C - Left: 1    Thyroid Stimulating Hormone   Date Value Ref Range Status   05/10/2024 1.98 0.44 - 3.98 mIU/L Final     Folate    Date Value Ref Range Status   10/08/2020 >23.3 >5.0 ng/mL Final     Comment:     Low           <3.4  Borderline 3.4-5.0  Normal        >5.0  .   Patients receiving more than 5 mg/day of biotin may have interference   in test results. A sample should be taken no sooner than eight hours   after previous dose. Contact the testing laboratory for additional   information.        Assessment/Plan   Ab Kendrick is a 79 y.o. year old male here for follow up of essential tremor.    Treatment's for essential tremor include several pharmacological agents. The American Academy of Neurology guidelines for essential tremor, gives Level A evidence to the use of Propranolol and primidone. There is level B evidence for gabapentin, topamax and benzodiazepines. (Vikas et al Neurology 2005, with update in 2011)     Given his medical issues, i would advise against primidone - which can interact w his Pradaxa. Also he is already on metoprolol and as per cardiology changing to propranolol was not advisable.  I think Gabapentin could be next possible option if needed.   Given that he has limited impact on his day to day due to the tremor, we agreed to hold off w medications at this time.   Also gives history of symptoms suggestive of REM sleep behavior disorder, this is acting out of dreams, this can be a precursor symptom of Parkinson's disease, and I will watch for this, but there are no other symptoms at this time. Behaviors have not increased and exam negative for other symptoms suggestive of PD.    RTC in 12-18 months.     Patient seen and discussed with attending physician, Dr. Ramos.    Liliana Leyva  PGY-4 Neurology     I saw and evaluated the patient. I personally obtained the key and critical portions of the history and physical exam or was physically present for key and critical portions performed by the resident/fellow. I reviewed the resident/fellow's documentation and discussed the patient with the resident/fellow.  I agree with the resident/fellow's medical decision making as documented in the note.    Destini Ramos MD       For the Evaluation and Management of this patient, the level of Medical Decision Making for this visit was determined based on the following:    The level of COMPLEXITY AND NUMBER OF PROBLEMS ADDRESSED was [LOW] as determined by:     LOW:  one stable chronic illness.    The AMOUNT/COMPLEXITY OF DATA TO REVIEW (reviewed, ordered or call for) was [ LIMITED] as determined by:    LIMITED:  my review of prior external notes from a unique source.      The level of RISK OF COMPLICATIONS was [ LOW] as determined by:    LOW:  A low risk of morbidity from additional diagnostic testing or treatment.      Thus, the level of medical decision making (based on the lower of the two highest elements) was determined to be LOW]. Therefore the appropriate E/M code for this encounter is [96574.

## 2025-01-06 NOTE — LETTER
January 6, 2025     Nikki Jang MD  [address]  [facility] [address]    Patient: KENDY Kendrick   YOB: 1945   Date of Visit: 1/6/2025       Dear Dr. Nikki Jang MD:    Thank you for referring KENDY Kendrick to me for evaluation. Below are my notes for this consultation.  If you have questions, please do not hesitate to call me. I look forward to following your patient along with you.       Sincerely,     Destini Ramos MD      CC: No Recipients  ______________________________________________________________________________________    Subjective    Ab Kendrick is a left handed  79 y.o. year old male who presents with Follow-up (9 MO FU). Patient is accompanied by: spouse  Visit type: follow up visit     Tremors remain unchanged from prior: continues to have difficulty w hand control and dexterity. Tremors of both hands remain isolated to action only. Handwriting is messy, but not smaller. Continues to eat at restaurants and participate in social activities. Can do most fine motor activity with extra time. Can hold a cup coffee fine, does not spill. Uses two hands when using a mouse on PC. Shaving- uses electrical razor.   There is no voice tremor. No voice or leg tremors. Patient endorsed feeling a little anxious but not interested in Psychiatry evaluation at this time.    Agrevating factors; none  Relieving factors etoh.      NO rest tremor, there is no stiffness or slowness of movements.   No changes in gait, no shuffle.   Mild softness of voice, maybe more raspy.   Balance: feels a little dizzy sometimes, especially if gets up quickly. No falls.      Non motor:   No hyposmia  denies constipation and urinary issues.   Some tightness when swallowing. No choking or food going down wrong pipe. Takes smaller bites.   Sleep is good. Very occasionally has nightmares, and will kick or shout. Happens once a month   Memory is good.      He loves to garden, he works out 3 times a  week, does weights etc.      Family hx:   father was a physician whom gave up surgery due to tremors.     Patient Active Problem List   Diagnosis   • Anemia   • Atrial flutter (Multi)   • Atrial tachycardia, paroxysmal (CMS-HCC)   • Chronic combined systolic and diastolic heart failure, NYHA class 2   • Chronic diarrhea   • Chronic sinusitis   • Cogwheel rigidity   • COPD (chronic obstructive pulmonary disease) (Multi)   • CAD (coronary artery disease)   • Erectile dysfunction   • Dyslipidemia   • Hyponatremia   • ICD (implantable cardioverter-defibrillator) in place   • Ischemic cardiomyopathy   • Knee pain, left   • Left inguinal hernia   • Macrocytosis without anemia   • Monoclonal gammopathy of unknown significance   • Nasal obstruction   • Nasal septal deviation   • PAC (premature atrial contraction)   • Paroxysmal atrial fibrillation (Multi)   • PVC (premature ventricular contraction)   • Right trigger finger   • Trigger finger of right thumb   • Non-recurrent unilateral inguinal hernia without obstruction or gangrene   • Accidental fall   • Cold hands   • Essential tremor   • Friction blisters of the soles   • Gastroesophageal reflux disease   • High alanine aminotransferase (ALT) level   • History of cardiac arrhythmia   • History of hearing problem   • History of metabolic disorder   • Osteoarthritis   • Overweight   • Palpitations   • Peripheral nerve disease   • Rhinitis   • S/P ICD (internal cardiac defibrillator) procedure   • S/P PTCA (percutaneous transluminal coronary angioplasty)   • ST elevation myocardial infarction (STEMI) of anterolateral wall (Multi)   • Tear of meniscus of knee   • Tremor   • Other ventricular tachycardia   • Well adult health check   • Chronic anticoagulation      Past Medical History:   Diagnosis Date   • CAD (coronary artery disease) 08/27/2023   • Chronic combined systolic and diastolic heart failure, NYHA class 2 08/27/2023   • Chronic rhinitis     Rhinitis   •  Diverticulosis of intestine, part unspecified, without perforation or abscess without bleeding     Diverticulosis   • HTN (hypertension) 2023   • Hyperlipidemia 2023   • Myocardial infarction (Multi)    • Persistent atrial fibrillation (Multi) 2023      Past Surgical History:   Procedure Laterality Date   • CARDIAC CATHETERIZATION  2017    Patent LAD and RCA stents.   • CORONARY ANGIOPLASTY WITH STENT PLACEMENT  2010    PCI to LAD   • CORONARY ANGIOPLASTY WITH STENT PLACEMENT  2011    PCI to RCA   • OTHER SURGICAL HISTORY  10/19/2021    Hernia repair   • OTHER SURGICAL HISTORY  10/19/2021    Complete colonoscopy   • OTHER SURGICAL HISTORY  10/19/2021    Cardioverter defibrillator insertion   • OTHER SURGICAL HISTORY  10/19/2021    Tonsillectomy   • OTHER SURGICAL HISTORY  10/19/2021    Cataract surgery      Social History     Socioeconomic History   • Marital status:      Spouse name: Not on file   • Number of children: Not on file   • Years of education: Not on file   • Highest education level: Not on file   Occupational History   • Not on file   Tobacco Use   • Smoking status: Former     Current packs/day: 0.00     Types: Cigarettes     Quit date:      Years since quittin.0   • Smokeless tobacco: Never   Vaping Use   • Vaping status: Never Used   Substance and Sexual Activity   • Alcohol use: Yes     Alcohol/week: 8.0 standard drinks of alcohol     Types: 8 Cans of beer per week     Comment: occasional   • Drug use: Never   • Sexual activity: Yes   Other Topics Concern   • Not on file   Social History Narrative   • Not on file     Social Drivers of Health     Financial Resource Strain: Not on file   Food Insecurity: Not on file   Transportation Needs: Not on file   Physical Activity: Not on file   Stress: Not on file   Social Connections: Not on file   Intimate Partner Violence: Not on file   Housing Stability: Not on file      Family History   Problem Relation  Name Age of Onset   • Cancer Father     • Prostate cancer Brother        Current Outpatient Medications on File Prior to Visit   Medication Sig Dispense Refill   • bismuth subsalicylate (ANTI-DIARRHEAL ORAL) Take by mouth if needed.     • coenzyme Q-10 100 mg capsule Take 1 capsule (100 mg) by mouth once daily.     • Comirnaty 2023-24, 12y up,,PF, 30 mcg/0.3 mL suspension      • dabigatran etexilate (Pradaxa) 150 mg capsule Take 1 capsule (150 mg) by mouth 2 times a day. 180 capsule 3   • dapagliflozin propanediol (Farxiga) 10 mg Take 1 tablet (10 mg) by mouth once daily. 90 tablet 3   • furosemide (Lasix) 20 mg tablet Take 1 tablet (20 mg) by mouth once daily as needed (swelling).     • glucosamine sulfate 1,000 mg capsule Take 2 capsules by mouth once daily.     • metoprolol succinate XL (Toprol-XL) 25 mg 24 hr tablet Take 1 tablet (25 mg) by mouth once daily. 90 tablet 3   • multivitamin tablet Take 1 tablet by mouth once daily.     • rosuvastatin (Crestor) 20 mg tablet Take 1 tablet (20 mg) by mouth once daily. 90 tablet 3   • sacubitriL-valsartan 97 mg-103 mg (Entresto)  mg tablet Take 1 tablet by mouth 2 times a day. 180 tablet 3   • sildenafil (Viagra) 50 mg tablet Take 1 tablet (50 mg) by mouth once daily as needed for erectile dysfunction. 20 tablet 3   • spironolactone (Aldactone) 25 mg tablet Take 0.5 tablets (12.5 mg) by mouth once daily. 45 tablet 3   • vitamin A 2,400 mcg capsule Take 1 capsule (2.4 mg) by mouth once daily.     • [DISCONTINUED] dapagliflozin propanediol (Farxiga) 10 mg Take 1 tablet (10 mg) by mouth once daily. 90 tablet 3     No current facility-administered medications on file prior to visit.      No Known Allergies     Objective  Vitals:    01/06/25 1301   BP: 121/79   Pulse: 72      Neurological Exam:  MENTAL STATUS:  General appearance: alert, NAD  Orientation: person, place, time  Language: Expression, repetition, naming, comprehension intact  Follows complex commands  across midline    CRANIAL NERVES:  - III, IV, VI: EOMI to pursuit without nystagmus  - VII: Face muscles symmetric with smile  - VIII: hard of hearing  - IX, X: Palate elevated symmetrically bilaterally, no hoarseness  - XI: 5/5 strength on shoulder shrugging bilaterally  - XII: Tongue midline without atrophy or fasciculation    MOTOR: Tone and bulk normal in all extremities    STRENGTH:  R L  Deltoid  5 5  Biceps  5 5  Triceps  5 5     5 5    Hip flexion  5 5  Quadriceps  5 5  Hamstrings  5 5    COORDINATION: Intact on finger to nose bl    GAIT: Normal standard gait     MDS UPDRS 1st Score: Motor Examination  Is the patient on medication for treating the symptoms of Parkinson's Disease?: No  Is the patient on Levodopa?: No  Speech: 0  Facial Expression: 0  Rigidty Neck: 0  Rigidty RUE: 0  Rigidity - LUE: 0  Rigidity RLE: 0  Rigidity LLE: 0  Finger Tapping Right Hand: 0  Finger Tapping Left Hand: 1  Hand Movements- Right Hand: 0  Hand Movements- Left Hand: 2  Pronatiaon-Supination Movments - Right Hand: 0  Pronatiaon-Supination Movments Left Hand: 0  Toe Tapping Right Foot: 0  Toe Tapping - Left Foot: 0  Leg Agility - Right Le  Leg Agility - Left le  Arising from Chair: 0  Gait: 0  Freezing of Gait: 0  Postural Stability: 0  Posture: 1  Global Spontanteity of Movment ( Body Bradykinesia): 0  Postural Tremor - Right Hand: 0  Postural Tremor - Left hand: 1  Kinetic Tremor - Right hand: 1  Kinetic Tremor - Left hand: 1  Rest Tremor Amplitude - RUE: 0  Rest Tremor Amplitude - LUE: 0  Rest Tremor Amplitude - RLE: 0  Rest Tremor Amplitude - LLE: 0  Rest Tremor Amplitude - Lip/Jaw: 0  Constancy of Rest Tremor: 0  MDS UPDRS Total Score: 7  Were dyskinesias (chorea or dystonia) present during examination?: No    Fahn Bobby Jain tremor rating scale:  PART A Face at rest: 0, Face at posture: 0, Face Total: 0, Tongue at rest: 0, Tongue at posture: 0, Tongue total: 0, Voice in action: 1, Voice total: 1, Head at  rest: 0, Head at posture: 1, Head total: 1, RUE at rest: 0, RUE at posture: 0, RUE in action: 1, RUE total: 1, LUE at rest: 0, LUE at posture: 1, LUE in action: 1, LUE total: 2, Trunk at rest: 0, Trunk at posture: 0, Trunk total: 0, RLE at rest: 0, RLE at posture: 0, RLE in action: 0, RLE total: 0, LLE at rest: 0, LLE at posture: 0, LLE in action: 0, LLE total: 0, Part A subtotal: 5 PART B Handwriting dominant: 1, Drawing A - Right: 1, Drawing A - Left: 0, Drawing A total: 1, Drawing B - Right: 2, Drawing B - Left: 1, Drawing B total: 3, Drawing C - Left: 1    Thyroid Stimulating Hormone   Date Value Ref Range Status   05/10/2024 1.98 0.44 - 3.98 mIU/L Final     Folate   Date Value Ref Range Status   10/08/2020 >23.3 >5.0 ng/mL Final     Comment:     Low           <3.4  Borderline 3.4-5.0  Normal        >5.0  .   Patients receiving more than 5 mg/day of biotin may have interference   in test results. A sample should be taken no sooner than eight hours   after previous dose. Contact the testing laboratory for additional   information.        Assessment/Plan  Ab Kendrick is a 79 y.o. year old male here for follow up of essential tremor.    Treatment's for essential tremor include several pharmacological agents. The American Academy of Neurology guidelines for essential tremor, gives Level A evidence to the use of Propranolol and primidone. There is level B evidence for gabapentin, topamax and benzodiazepines. (Vikas et al Neurology 2005, with update in 2011)     Given his medical issues, i would advise against primidone - which can interact w his Pradaxa. Also he is already on metoprolol and as per cardiology changing to propranolol was not advisable.  I think Gabapentin could be next possible option if needed.   Given that he has limited impact on his day to day due to the tremor, we agreed to hold off w medications at this time.   Also gives history of symptoms suggestive of REM sleep behavior disorder,  this is acting out of dreams, this can be a precursor symptom of Parkinson's disease, and I will watch for this, but there are no other symptoms at this time. Behaviors have not increased and exam negative for other symptoms suggestive of PD.    RTC in 12-18 months.     Patient seen and discussed with attending physician, Dr. Ramos.    Liliana Ortega Autumn  PGY-4 Neurology     I saw and evaluated the patient. I personally obtained the key and critical portions of the history and physical exam or was physically present for key and critical portions performed by the resident/fellow. I reviewed the resident/fellow's documentation and discussed the patient with the resident/fellow. I agree with the resident/fellow's medical decision making as documented in the note.    Destini Ramos MD       For the Evaluation and Management of this patient, the level of Medical Decision Making for this visit was determined based on the following:    The level of COMPLEXITY AND NUMBER OF PROBLEMS ADDRESSED was [LOW] as determined by:     LOW:  one stable chronic illness.    The AMOUNT/COMPLEXITY OF DATA TO REVIEW (reviewed, ordered or call for) was [ LIMITED] as determined by:    LIMITED:  my review of prior external notes from a unique source.      The level of RISK OF COMPLICATIONS was [ LOW] as determined by:    LOW:  A low risk of morbidity from additional diagnostic testing or treatment.      Thus, the level of medical decision making (based on the lower of the two highest elements) was determined to be LOW]. Therefore the appropriate E/M code for this encounter is [66708.

## 2025-01-06 NOTE — PATIENT INSTRUCTIONS
Marge Fangjessica,    You were seen for tremor. Your tremors appear stable. Please return to clinic in approximately a year.

## 2025-01-22 ENCOUNTER — HOSPITAL ENCOUNTER (OUTPATIENT)
Dept: CARDIOLOGY | Facility: HOSPITAL | Age: 80
Discharge: HOME | End: 2025-01-22
Payer: MEDICARE

## 2025-01-22 DIAGNOSIS — Z95.810 AICD (AUTOMATIC CARDIOVERTER/DEFIBRILLATOR) PRESENT: ICD-10-CM

## 2025-01-22 DIAGNOSIS — I47.29 VENTRICULAR TACHYCARDIA (PAROXYSMAL) (MULTI): ICD-10-CM

## 2025-02-03 DIAGNOSIS — I50.42 CHRONIC COMBINED SYSTOLIC AND DIASTOLIC HEART FAILURE, NYHA CLASS 2: ICD-10-CM

## 2025-02-03 RX ORDER — SACUBITRIL AND VALSARTAN 97; 103 MG/1; MG/1
1 TABLET, FILM COATED ORAL 2 TIMES DAILY
Qty: 180 TABLET | Refills: 3 | Status: SHIPPED | OUTPATIENT
Start: 2025-02-03 | End: 2026-02-03

## 2025-02-24 ENCOUNTER — APPOINTMENT (OUTPATIENT)
Dept: PRIMARY CARE | Facility: CLINIC | Age: 80
End: 2025-02-24
Payer: MEDICARE

## 2025-03-04 ENCOUNTER — APPOINTMENT (OUTPATIENT)
Dept: PRIMARY CARE | Facility: CLINIC | Age: 80
End: 2025-03-04
Payer: MEDICARE

## 2025-03-04 VITALS — HEART RATE: 76 BPM | SYSTOLIC BLOOD PRESSURE: 90 MMHG | DIASTOLIC BLOOD PRESSURE: 56 MMHG

## 2025-03-04 DIAGNOSIS — Z87.891 FORMER SMOKER: ICD-10-CM

## 2025-03-04 DIAGNOSIS — Z98.61 S/P PTCA (PERCUTANEOUS TRANSLUMINAL CORONARY ANGIOPLASTY): ICD-10-CM

## 2025-03-04 DIAGNOSIS — I47.19 ATRIAL TACHYCARDIA, PAROXYSMAL (CMS-HCC): ICD-10-CM

## 2025-03-04 DIAGNOSIS — M21.942 DEFORMITY OF LEFT HAND: Primary | ICD-10-CM

## 2025-03-04 DIAGNOSIS — R25.1 TREMOR: ICD-10-CM

## 2025-03-04 DIAGNOSIS — E78.00 HYPERCHOLESTEROLEMIA: ICD-10-CM

## 2025-03-04 DIAGNOSIS — I50.42 CHRONIC COMBINED SYSTOLIC AND DIASTOLIC HEART FAILURE, NYHA CLASS 2: ICD-10-CM

## 2025-03-04 DIAGNOSIS — I25.10 CORONARY ARTERY DISEASE INVOLVING NATIVE CORONARY ARTERY OF NATIVE HEART WITHOUT ANGINA PECTORIS: ICD-10-CM

## 2025-03-04 DIAGNOSIS — I48.0 PAROXYSMAL ATRIAL FIBRILLATION (MULTI): ICD-10-CM

## 2025-03-04 DIAGNOSIS — I25.5 ISCHEMIC CARDIOMYOPATHY: ICD-10-CM

## 2025-03-04 PROCEDURE — 1036F TOBACCO NON-USER: CPT | Performed by: INTERNAL MEDICINE

## 2025-03-04 PROCEDURE — 1159F MED LIST DOCD IN RCRD: CPT | Performed by: INTERNAL MEDICINE

## 2025-03-04 PROCEDURE — 1123F ACP DISCUSS/DSCN MKR DOCD: CPT | Performed by: INTERNAL MEDICINE

## 2025-03-04 PROCEDURE — 99214 OFFICE O/P EST MOD 30 MIN: CPT | Performed by: INTERNAL MEDICINE

## 2025-03-04 NOTE — PROGRESS NOTES
"Assessment/Plan   Problem List Items Addressed This Visit       Atrial tachycardia, paroxysmal (CMS-HCC)    Chronic combined systolic and diastolic heart failure, NYHA class 2    CAD (coronary artery disease)    Ischemic cardiomyopathy    Paroxysmal atrial fibrillation (Multi)    S/P PTCA (percutaneous transluminal coronary angioplasty)    Tremor    Deformity of left hand - Primary    Relevant Orders    Referral to Orthopedics and Sports Medicine    Hypercholesterolemia    Relevant Orders    Comprehensive metabolic panel    Lipid panel    Body mass index (BMI) of 23.0 to 23.9 in adult    Former smoker   Advised to see the hand surgeon  May need injection in the joint if it is nonoperative measures  Other conditions stable  Labs as ordered  Follow-up in 3 months unless otherwise indicated    Subjective   Patient ID: Ab Kendrick \"KENDY\" is a 79 y.o. male who presents for Trigger Finger (Left thumb).    Past Surgical History:   Procedure Laterality Date    CARDIAC CATHETERIZATION  01/31/2017    Patent LAD and RCA stents.    CORONARY ANGIOPLASTY WITH STENT PLACEMENT  12/02/2010    PCI to LAD    CORONARY ANGIOPLASTY WITH STENT PLACEMENT  05/19/2011    PCI to RCA    OTHER SURGICAL HISTORY  10/19/2021    Hernia repair    OTHER SURGICAL HISTORY  10/19/2021    Complete colonoscopy    OTHER SURGICAL HISTORY  10/19/2021    Cardioverter defibrillator insertion    OTHER SURGICAL HISTORY  10/19/2021    Tonsillectomy    OTHER SURGICAL HISTORY  10/19/2021    Cataract surgery      Family History   Problem Relation Name Age of Onset    Cancer Father      Prostate cancer Brother        Social History     Socioeconomic History    Marital status:      Spouse name: Not on file    Number of children: Not on file    Years of education: Not on file    Highest education level: Not on file   Occupational History    Not on file   Tobacco Use    Smoking status: Former     Current packs/day: 0.00     Types: Cigarettes     Quit date: " 2013     Years since quittin.1    Smokeless tobacco: Never   Vaping Use    Vaping status: Never Used   Substance and Sexual Activity    Alcohol use: Yes     Alcohol/week: 8.0 standard drinks of alcohol     Types: 8 Cans of beer per week     Comment: occasional    Drug use: Never    Sexual activity: Yes   Other Topics Concern    Not on file   Social History Narrative    Not on file     Social Drivers of Health     Financial Resource Strain: Not on file   Food Insecurity: Not on file   Transportation Needs: Not on file   Physical Activity: Not on file   Stress: Not on file   Social Connections: Not on file   Intimate Partner Violence: Not on file   Housing Stability: Not on file      Patient has no known allergies.   Current Outpatient Medications   Medication Sig Dispense Refill    bismuth subsalicylate (ANTI-DIARRHEAL ORAL) Take by mouth if needed.      coenzyme Q-10 100 mg capsule Take 1 capsule (100 mg) by mouth once daily.      Comirnaty -, 12y up,,PF, 30 mcg/0.3 mL suspension       dabigatran etexilate (Pradaxa) 150 mg capsule Take 1 capsule (150 mg) by mouth 2 times a day. 180 capsule 3    dapagliflozin propanediol (Farxiga) 10 mg Take 1 tablet (10 mg) by mouth once daily. 90 tablet 3    furosemide (Lasix) 20 mg tablet Take 1 tablet (20 mg) by mouth once daily as needed (swelling).      glucosamine sulfate 1,000 mg capsule Take 2 capsules by mouth once daily.      metoprolol succinate XL (Toprol-XL) 25 mg 24 hr tablet Take 1 tablet (25 mg) by mouth once daily. 90 tablet 3    multivitamin tablet Take 1 tablet by mouth once daily.      rosuvastatin (Crestor) 20 mg tablet Take 1 tablet (20 mg) by mouth once daily. 90 tablet 3    sacubitriL-valsartan (Entresto)  mg tablet Take 1 tablet by mouth 2 times a day. 180 tablet 3    sildenafil (Viagra) 50 mg tablet Take 1 tablet (50 mg) by mouth once daily as needed for erectile dysfunction. 20 tablet 3    spironolactone (Aldactone) 25 mg tablet Take 0.5  tablets (12.5 mg) by mouth once daily. 45 tablet 3    vitamin A 2,400 mcg capsule Take 1 capsule (2.4 mg) by mouth once daily.       No current facility-administered medications for this visit.      Vitals:    03/04/25 1153   BP: 90/56   BP Location: Right arm   Patient Position: Sitting   Pulse: 76      Problem List Items Addressed This Visit       Atrial tachycardia, paroxysmal (CMS-HCC)    Chronic combined systolic and diastolic heart failure, NYHA class 2    CAD (coronary artery disease)    Ischemic cardiomyopathy    Paroxysmal atrial fibrillation (Multi)    S/P PTCA (percutaneous transluminal coronary angioplasty)    Tremor    Deformity of left hand - Primary    Relevant Orders    Referral to Orthopedics and Sports Medicine    Hypercholesterolemia    Relevant Orders    Comprehensive metabolic panel    Lipid panel    Body mass index (BMI) of 23.0 to 23.9 in adult    Former smoker      Orders Placed This Encounter   Procedures    Comprehensive metabolic panel     Standing Status:   Future     Number of Occurrences:   1     Standing Expiration Date:   3/4/2026     Order Specific Question:   Release result to [employer]     Answer:   Immediate [1]    Lipid panel     Standing Status:   Future     Number of Occurrences:   1     Standing Expiration Date:   3/4/2026     Order Specific Question:   Release result to [employer]     Answer:   Immediate [1]    Referral to Orthopedics and Sports Medicine     Standing Status:   Future     Standing Expiration Date:   3/4/2026     Referral Priority:   Routine     Referral Type:   Consultation     Referral Reason:   Specialty Services Required     Requested Specialty:   Orthopaedic Surgery     Number of Visits Requested:   1        HPI  Came for periodic review  Doing well  He had some problem in the left hand he has seen the hand surgeon before but he has main problem and pain in the left thumb in the proximal interphalangeal joint or carpometacarpal  And it is tender    ROS  He  "continues to have tremor but is stable  Otherwise negative    PHYSICAL EXAM  Evidence of deformity of the left hand which she says that the traumas happened before  Tenderness in the carpometacarpal joint area  Deformity of the fingers noted  Heart sounds regular  Chest clear  Abdomen  soft nontender  Neuro awake and alert      No results found for: \"PR1\", \"BMPR1A\", \"CMPLAS\", \"AS3FOCLZ\", \"KPSAT\"   Lab Results   Component Value Date    CHOL 176 10/18/2024    LDLCALC 92 10/18/2024    CHHDL 2.6 10/18/2024      Scribe Attestation  By signing my name below, I, Nikki Jang MD  , Scribe   attest that this documentation has been prepared under the direction and in the presence of Nikki Jang MD.           "

## 2025-03-06 ENCOUNTER — HOSPITAL ENCOUNTER (OUTPATIENT)
Dept: CARDIOLOGY | Facility: HOSPITAL | Age: 80
Discharge: HOME | End: 2025-03-06
Payer: MEDICARE

## 2025-03-06 DIAGNOSIS — Z95.810 AICD (AUTOMATIC CARDIOVERTER/DEFIBRILLATOR) PRESENT: ICD-10-CM

## 2025-03-06 DIAGNOSIS — I47.29 VENTRICULAR TACHYCARDIA (PAROXYSMAL) (MULTI): ICD-10-CM

## 2025-03-06 PROCEDURE — 93295 DEV INTERROG REMOTE 1/2/MLT: CPT | Performed by: INTERNAL MEDICINE

## 2025-03-06 PROCEDURE — 93296 REM INTERROG EVL PM/IDS: CPT

## 2025-03-08 LAB
ALBUMIN SERPL-MCNC: 4.2 G/DL (ref 3.6–5.1)
ALP SERPL-CCNC: 46 U/L (ref 35–144)
ALT SERPL-CCNC: 16 U/L (ref 9–46)
ANION GAP SERPL CALCULATED.4IONS-SCNC: 11 MMOL/L (CALC) (ref 7–17)
AST SERPL-CCNC: 27 U/L (ref 10–35)
BILIRUB SERPL-MCNC: 0.4 MG/DL (ref 0.2–1.2)
BUN SERPL-MCNC: 15 MG/DL (ref 7–25)
CALCIUM SERPL-MCNC: 8.6 MG/DL (ref 8.6–10.3)
CHLORIDE SERPL-SCNC: 98 MMOL/L (ref 98–110)
CHOLEST SERPL-MCNC: 128 MG/DL
CHOLEST/HDLC SERPL: 2.1 (CALC)
CO2 SERPL-SCNC: 25 MMOL/L (ref 20–32)
CREAT SERPL-MCNC: 0.87 MG/DL (ref 0.7–1.22)
EGFRCR SERPLBLD CKD-EPI 2021: 87 ML/MIN/1.73M2
GLUCOSE SERPL-MCNC: ABNORMAL MG/DL
HDLC SERPL-MCNC: 60 MG/DL
LDLC SERPL CALC-MCNC: 53 MG/DL (CALC)
NONHDLC SERPL-MCNC: 68 MG/DL (CALC)
POTASSIUM SERPL-SCNC: 5.1 MMOL/L (ref 3.5–5.3)
PROT SERPL-MCNC: 7.2 G/DL (ref 6.1–8.1)
SODIUM SERPL-SCNC: 134 MMOL/L (ref 135–146)
TRIGL SERPL-MCNC: 69 MG/DL

## 2025-03-24 DIAGNOSIS — Z95.810 CARDIAC DEFIBRILLATOR IN PLACE: Primary | ICD-10-CM

## 2025-03-24 DIAGNOSIS — I47.20 VENTRICULAR TACHYCARDIA (MULTI): ICD-10-CM

## 2025-03-27 DIAGNOSIS — I50.42 CHRONIC COMBINED SYSTOLIC AND DIASTOLIC HEART FAILURE, NYHA CLASS 2: Primary | ICD-10-CM

## 2025-03-27 RX ORDER — FUROSEMIDE 20 MG/1
20 TABLET ORAL DAILY PRN
Qty: 90 TABLET | Refills: 3 | Status: SHIPPED | OUTPATIENT
Start: 2025-03-27 | End: 2026-03-27

## 2025-03-28 ENCOUNTER — HOSPITAL ENCOUNTER (OUTPATIENT)
Dept: CARDIOLOGY | Facility: CLINIC | Age: 80
Discharge: HOME | End: 2025-03-28
Payer: MEDICARE

## 2025-03-28 DIAGNOSIS — Z95.810 ICD (IMPLANTABLE CARDIOVERTER-DEFIBRILLATOR) IN PLACE: ICD-10-CM

## 2025-03-28 PROCEDURE — 93283 PRGRMG EVAL IMPLANTABLE DFB: CPT | Performed by: NURSE PRACTITIONER

## 2025-03-28 PROCEDURE — 93283 PRGRMG EVAL IMPLANTABLE DFB: CPT

## 2025-04-09 DIAGNOSIS — I25.10 CORONARY ARTERY DISEASE, UNSPECIFIED VESSEL OR LESION TYPE, UNSPECIFIED WHETHER ANGINA PRESENT, UNSPECIFIED WHETHER NATIVE OR TRANSPLANTED HEART: ICD-10-CM

## 2025-04-09 RX ORDER — DAPAGLIFLOZIN 10 MG/1
10 TABLET, FILM COATED ORAL DAILY
Qty: 90 TABLET | Refills: 3 | Status: SHIPPED | OUTPATIENT
Start: 2025-04-09 | End: 2026-04-09

## 2025-04-14 ENCOUNTER — HOSPITAL ENCOUNTER (OUTPATIENT)
Dept: CARDIOLOGY | Facility: HOSPITAL | Age: 80
Discharge: HOME | End: 2025-04-14
Payer: MEDICARE

## 2025-04-14 DIAGNOSIS — I47.20 VENTRICULAR TACHYCARDIA (PAROXYSMAL): ICD-10-CM

## 2025-04-14 DIAGNOSIS — Z95.810 AICD (AUTOMATIC CARDIOVERTER/DEFIBRILLATOR) PRESENT: ICD-10-CM

## 2025-04-18 ENCOUNTER — APPOINTMENT (OUTPATIENT)
Dept: CARDIOLOGY | Facility: CLINIC | Age: 80
End: 2025-04-18
Payer: MEDICARE

## 2025-04-22 ENCOUNTER — APPOINTMENT (OUTPATIENT)
Dept: CARDIOLOGY | Facility: CLINIC | Age: 80
End: 2025-04-22
Payer: MEDICARE

## 2025-04-30 ENCOUNTER — PATIENT MESSAGE (OUTPATIENT)
Dept: PRIMARY CARE | Facility: CLINIC | Age: 80
End: 2025-04-30
Payer: MEDICARE

## 2025-05-07 PROBLEM — I35.0 NONRHEUMATIC AORTIC (VALVE) STENOSIS: Status: ACTIVE | Noted: 2025-05-07

## 2025-05-07 PROBLEM — I25.5 ISCHEMIC CARDIOMYOPATHY: Status: RESOLVED | Noted: 2023-08-27 | Resolved: 2025-05-07

## 2025-05-07 PROBLEM — Z86.79 HISTORY OF CARDIAC ARRHYTHMIA: Status: RESOLVED | Noted: 2024-05-02 | Resolved: 2025-05-07

## 2025-05-08 ENCOUNTER — OFFICE VISIT (OUTPATIENT)
Dept: CARDIOLOGY | Facility: CLINIC | Age: 80
End: 2025-05-08
Payer: MEDICARE

## 2025-05-08 VITALS
BODY MASS INDEX: 22.81 KG/M2 | SYSTOLIC BLOOD PRESSURE: 120 MMHG | WEIGHT: 154 LBS | DIASTOLIC BLOOD PRESSURE: 70 MMHG | HEIGHT: 69 IN | HEART RATE: 60 BPM | OXYGEN SATURATION: 97 %

## 2025-05-08 DIAGNOSIS — I48.0 PAROXYSMAL ATRIAL FIBRILLATION (MULTI): ICD-10-CM

## 2025-05-08 DIAGNOSIS — E78.5 DYSLIPIDEMIA: ICD-10-CM

## 2025-05-08 DIAGNOSIS — I25.10 CORONARY ARTERY DISEASE, UNSPECIFIED VESSEL OR LESION TYPE, UNSPECIFIED WHETHER ANGINA PRESENT, UNSPECIFIED WHETHER NATIVE OR TRANSPLANTED HEART: ICD-10-CM

## 2025-05-08 DIAGNOSIS — I25.10 CORONARY ARTERY DISEASE INVOLVING NATIVE CORONARY ARTERY OF NATIVE HEART WITHOUT ANGINA PECTORIS: ICD-10-CM

## 2025-05-08 DIAGNOSIS — I35.0 NONRHEUMATIC AORTIC (VALVE) STENOSIS: ICD-10-CM

## 2025-05-08 DIAGNOSIS — I15.9 SECONDARY HYPERTENSION: ICD-10-CM

## 2025-05-08 DIAGNOSIS — N52.9 ERECTILE DISORDER: ICD-10-CM

## 2025-05-08 DIAGNOSIS — I50.42 CHRONIC COMBINED SYSTOLIC AND DIASTOLIC HEART FAILURE, NYHA CLASS 2: Primary | ICD-10-CM

## 2025-05-08 DIAGNOSIS — I10 PRIMARY HYPERTENSION: ICD-10-CM

## 2025-05-08 PROCEDURE — 3074F SYST BP LT 130 MM HG: CPT | Performed by: INTERNAL MEDICINE

## 2025-05-08 PROCEDURE — 99214 OFFICE O/P EST MOD 30 MIN: CPT | Mod: 25 | Performed by: INTERNAL MEDICINE

## 2025-05-08 PROCEDURE — 1036F TOBACCO NON-USER: CPT | Performed by: INTERNAL MEDICINE

## 2025-05-08 PROCEDURE — 3078F DIAST BP <80 MM HG: CPT | Performed by: INTERNAL MEDICINE

## 2025-05-08 PROCEDURE — 1160F RVW MEDS BY RX/DR IN RCRD: CPT | Performed by: INTERNAL MEDICINE

## 2025-05-08 PROCEDURE — 93005 ELECTROCARDIOGRAM TRACING: CPT | Performed by: INTERNAL MEDICINE

## 2025-05-08 PROCEDURE — 93010 ELECTROCARDIOGRAM REPORT: CPT | Performed by: INTERNAL MEDICINE

## 2025-05-08 PROCEDURE — 99214 OFFICE O/P EST MOD 30 MIN: CPT | Performed by: INTERNAL MEDICINE

## 2025-05-08 PROCEDURE — 1159F MED LIST DOCD IN RCRD: CPT | Performed by: INTERNAL MEDICINE

## 2025-05-08 RX ORDER — SACUBITRIL AND VALSARTAN 97; 103 MG/1; MG/1
1 TABLET, FILM COATED ORAL 2 TIMES DAILY
Qty: 180 TABLET | Refills: 3 | Status: SHIPPED | OUTPATIENT
Start: 2025-05-08 | End: 2026-05-08

## 2025-05-08 RX ORDER — METOPROLOL SUCCINATE 25 MG/1
25 TABLET, EXTENDED RELEASE ORAL DAILY
Qty: 90 TABLET | Refills: 3 | Status: SHIPPED | OUTPATIENT
Start: 2025-05-08 | End: 2026-05-08

## 2025-05-08 RX ORDER — DABIGATRAN ETEXILATE 150 MG/1
150 CAPSULE ORAL 2 TIMES DAILY
Qty: 180 CAPSULE | Refills: 3 | Status: SHIPPED | OUTPATIENT
Start: 2025-05-08 | End: 2026-05-08

## 2025-05-08 RX ORDER — DAPAGLIFLOZIN 10 MG/1
10 TABLET, FILM COATED ORAL DAILY
Qty: 90 TABLET | Refills: 3 | Status: SHIPPED | OUTPATIENT
Start: 2025-05-08 | End: 2026-05-08

## 2025-05-08 RX ORDER — SPIRONOLACTONE 25 MG/1
12.5 TABLET ORAL DAILY
Qty: 45 TABLET | Refills: 3 | Status: SHIPPED | OUTPATIENT
Start: 2025-05-08 | End: 2026-05-08

## 2025-05-08 RX ORDER — SILDENAFIL 50 MG/1
50 TABLET, FILM COATED ORAL DAILY PRN
Qty: 24 TABLET | Refills: 3 | Status: SHIPPED | OUTPATIENT
Start: 2025-05-08 | End: 2026-05-08

## 2025-05-08 RX ORDER — FUROSEMIDE 20 MG/1
20 TABLET ORAL DAILY PRN
Qty: 90 TABLET | Refills: 3 | Status: SHIPPED | OUTPATIENT
Start: 2025-05-08 | End: 2026-05-08

## 2025-05-16 ENCOUNTER — HOSPITAL ENCOUNTER (OUTPATIENT)
Dept: CARDIOLOGY | Facility: HOSPITAL | Age: 80
Discharge: HOME | End: 2025-05-16
Payer: MEDICARE

## 2025-05-16 DIAGNOSIS — I47.20 VENTRICULAR TACHYCARDIA (PAROXYSMAL): ICD-10-CM

## 2025-05-16 DIAGNOSIS — Z95.810 AICD (AUTOMATIC CARDIOVERTER/DEFIBRILLATOR) PRESENT: ICD-10-CM

## 2025-06-17 ENCOUNTER — OFFICE VISIT (OUTPATIENT)
Dept: PRIMARY CARE | Facility: CLINIC | Age: 80
End: 2025-06-17
Payer: MEDICARE

## 2025-06-17 VITALS
WEIGHT: 161 LBS | HEIGHT: 69 IN | SYSTOLIC BLOOD PRESSURE: 78 MMHG | DIASTOLIC BLOOD PRESSURE: 47 MMHG | HEART RATE: 62 BPM | BODY MASS INDEX: 23.85 KG/M2

## 2025-06-17 DIAGNOSIS — B35.1 ONYCHOMYCOSIS: ICD-10-CM

## 2025-06-17 DIAGNOSIS — L23.7 POISON IVY: Primary | ICD-10-CM

## 2025-06-17 PROCEDURE — 99213 OFFICE O/P EST LOW 20 MIN: CPT | Performed by: FAMILY MEDICINE

## 2025-06-17 PROCEDURE — 1036F TOBACCO NON-USER: CPT | Performed by: FAMILY MEDICINE

## 2025-06-17 PROCEDURE — 1159F MED LIST DOCD IN RCRD: CPT | Performed by: FAMILY MEDICINE

## 2025-06-17 PROCEDURE — 1158F ADVNC CARE PLAN TLK DOCD: CPT | Performed by: FAMILY MEDICINE

## 2025-06-17 RX ORDER — PREDNISONE 10 MG/1
TABLET ORAL
Qty: 30 TABLET | Refills: 0 | Status: SHIPPED | OUTPATIENT
Start: 2025-06-17 | End: 2025-06-27

## 2025-06-17 RX ORDER — VIT C/E/ZN/COPPR/LUTEIN/ZEAXAN 250MG-90MG
180 CAPSULE ORAL DAILY
COMMUNITY

## 2025-06-17 RX ORDER — CLOTRIMAZOLE 1 %
CREAM (GRAM) TOPICAL 2 TIMES DAILY
Qty: 30 G | Refills: 0 | Status: SHIPPED | OUTPATIENT
Start: 2025-06-17 | End: 2025-10-15

## 2025-06-17 RX ORDER — BISMUTH SUBSALICYLATE 262 MG
1 TABLET,CHEWABLE ORAL DAILY
COMMUNITY

## 2025-06-17 ASSESSMENT — ENCOUNTER SYMPTOMS
CHEST TIGHTNESS: 0
FATIGUE: 0
DIZZINESS: 0
SHORTNESS OF BREATH: 0
CHILLS: 0
HEADACHES: 0

## 2025-06-17 NOTE — PROGRESS NOTES
"Subjective   Patient ID: KENDY Kendrick is a 79 y.o. male who presents for Sick Visit (Red rash on his arms legs and behind his ears).    Skin rash   - reports he has a red rash that appeared on his arms and legs   - noticed after he was working in his yard   - he has had itching   - small vesicles that occasionally open up   - treating with topical calamine which has helped with symptoms   - no other systemic symptoms            Review of Systems   Constitutional:  Negative for chills and fatigue.   Respiratory:  Negative for chest tightness and shortness of breath.    Neurological:  Negative for dizziness and headaches.       Objective   BP (!) 78/47   Pulse 62   Ht 1.753 m (5' 9\")   Wt 73 kg (161 lb)   BMI 23.78 kg/m²     Physical Exam  Constitutional:       Appearance: Normal appearance.   Neurological:      Mental Status: He is alert.   Psychiatric:         Mood and Affect: Mood normal.         Behavior: Behavior normal.         Assessment/Plan   Problem List Items Addressed This Visit    None  Visit Diagnoses         Codes      Poison ivy    -  Primary L23.7    stable   - treat with oral prednisone   - discussed monitoring for exposure     Relevant Medications    predniSONE (Deltasone) 10 mg tablet      Onychomycosis     B35.1    stable  - tx with topical clotrimazole   - f/u with dermatology if not improving     Relevant Medications    clotrimazole (Lotrimin) 1 % cream               "

## 2025-07-09 ENCOUNTER — HOSPITAL ENCOUNTER (OUTPATIENT)
Dept: CARDIOLOGY | Facility: HOSPITAL | Age: 80
Discharge: HOME | End: 2025-07-09
Payer: MEDICARE

## 2025-07-09 DIAGNOSIS — I47.20 VENTRICULAR TACHYCARDIA (PAROXYSMAL): ICD-10-CM

## 2025-07-09 DIAGNOSIS — Z95.810 AICD (AUTOMATIC CARDIOVERTER/DEFIBRILLATOR) PRESENT: ICD-10-CM

## 2025-07-09 PROCEDURE — 93296 REM INTERROG EVL PM/IDS: CPT

## 2025-07-16 PROBLEM — E78.00 HYPERCHOLESTEROLEMIA: Status: RESOLVED | Noted: 2025-03-04 | Resolved: 2025-07-16

## 2025-07-16 PROBLEM — I47.19 ATRIAL TACHYCARDIA, PAROXYSMAL: Status: RESOLVED | Noted: 2023-08-27 | Resolved: 2025-07-16

## 2025-07-16 PROBLEM — I49.1 PAC (PREMATURE ATRIAL CONTRACTION): Status: RESOLVED | Noted: 2023-08-27 | Resolved: 2025-07-16

## 2025-07-16 PROBLEM — Z86.39 HISTORY OF METABOLIC DISORDER: Status: RESOLVED | Noted: 2024-05-02 | Resolved: 2025-07-16

## 2025-07-16 PROBLEM — I48.92 ATRIAL FLUTTER (MULTI): Status: RESOLVED | Noted: 2023-08-27 | Resolved: 2025-07-16

## 2025-07-16 PROBLEM — I47.29 OTHER VENTRICULAR TACHYCARDIA: Status: RESOLVED | Noted: 2023-08-03 | Resolved: 2025-07-16

## 2025-07-16 PROBLEM — E66.3 OVERWEIGHT: Status: RESOLVED | Noted: 2024-05-02 | Resolved: 2025-07-16

## 2025-07-16 PROBLEM — I49.3 PVC (PREMATURE VENTRICULAR CONTRACTION): Status: RESOLVED | Noted: 2023-08-27 | Resolved: 2025-07-16

## 2025-07-16 PROBLEM — I48.0 PAROXYSMAL ATRIAL FIBRILLATION (MULTI): Status: RESOLVED | Noted: 2023-08-27 | Resolved: 2025-07-16

## 2025-07-24 DIAGNOSIS — E78.5 DYSLIPIDEMIA: ICD-10-CM

## 2025-07-24 RX ORDER — ROSUVASTATIN CALCIUM 20 MG/1
20 TABLET, COATED ORAL DAILY
Qty: 90 TABLET | Refills: 3 | Status: SHIPPED | OUTPATIENT
Start: 2025-07-24

## 2025-08-07 ENCOUNTER — APPOINTMENT (OUTPATIENT)
Dept: CARDIOLOGY | Facility: CLINIC | Age: 80
End: 2025-08-07
Payer: MEDICARE

## 2025-08-07 VITALS
BODY MASS INDEX: 23.49 KG/M2 | OXYGEN SATURATION: 94 % | WEIGHT: 158.6 LBS | DIASTOLIC BLOOD PRESSURE: 60 MMHG | HEIGHT: 69 IN | HEART RATE: 82 BPM | SYSTOLIC BLOOD PRESSURE: 96 MMHG

## 2025-08-07 DIAGNOSIS — Z95.810 ICD (IMPLANTABLE CARDIOVERTER-DEFIBRILLATOR) IN PLACE: Primary | ICD-10-CM

## 2025-08-07 DIAGNOSIS — Z98.61 S/P PTCA (PERCUTANEOUS TRANSLUMINAL CORONARY ANGIOPLASTY): ICD-10-CM

## 2025-08-07 DIAGNOSIS — I50.42 CHRONIC COMBINED SYSTOLIC AND DIASTOLIC HEART FAILURE, NYHA CLASS 2: ICD-10-CM

## 2025-08-07 DIAGNOSIS — I10 ESSENTIAL (PRIMARY) HYPERTENSION: ICD-10-CM

## 2025-08-07 DIAGNOSIS — I25.10 CORONARY ARTERY DISEASE INVOLVING NATIVE CORONARY ARTERY OF NATIVE HEART WITHOUT ANGINA PECTORIS: ICD-10-CM

## 2025-08-07 DIAGNOSIS — E78.5 DYSLIPIDEMIA: ICD-10-CM

## 2025-08-07 NOTE — PROGRESS NOTES
"    Cardiac Electrophysiology Office Visit     Referred by Dr. Rob, Floyd RUVALCABA MD for No chief complaint on file.      Ab Kendrick is a 79 y.o. year old male patient with h/o CAD/MI s/p PCI (2010), HLD, HTN, s/p ICD (2017), pAF s/p RFA (PVI 2021) presenting today to establish care    History of Present Illness  Ab Kendrick \"SAM" is an 79 year old male with heart failure and atrial fibrillation who presents for follow-up of his cardiac device and heart failure management.    He has a history of heart failure and had a myocardial infarction in 2010, for which stents were placed. He underwent an AFib ablation in 2021. He experiences occasional lightheadedness, particularly when bending over and standing up quickly. No history of cardiac arrest or strokes.    A defibrillator pacemaker was implanted in February 2017. The device was last checked on July 8th, showing about eight months of battery life remaining. It is primarily used for pacing the top chamber of his heart. He has never experienced shocks outside of initial testing.    His atrial fibrillation burden is low, less than 0.2%, although he experienced episodes lasting several hours between December and April. He describes symptoms of 'fluttering' during AFib episodes. He has not been on heart rhythm medications. He switched from Eliquis to Pradaxa 150 mg twice daily due to cost considerations.    For heart failure, he is on metoprolol XL 25 mg once daily, Entresto 97/103 mg twice daily, Aldactone 12.5 mg once daily, Farxiga 10 mg once daily, and Lasix 20 mg as needed, which he uses about once or twice a month when feeling bloated. He remains physically active, exercising three days a week, and engages in activities such as gardening and mowing the lawn without significant limitations.    He is also on rosuvastatin 20 mg daily for high cholesterol, given his history of heart attack and stenting.    Objective  Current Outpatient Medications " "  Medication Instructions    bismuth subsalicylate (ANTI-DIARRHEAL ORAL) As needed    clotrimazole (Lotrimin) 1 % cream Topical, 2 times daily, apply to affected area    coenzyme Q-10 100 mg, Daily    dabigatran etexilate (PRADAXA) 150 mg, oral, 2 times daily    dapagliflozin propanediol (FARXIGA) 10 mg, oral, Daily    furosemide (LASIX) 20 mg, oral, Daily PRN    glucosamine sulfate 1,000 mg capsule 2 capsules, Daily    metoprolol succinate XL (TOPROL-XL) 25 mg, oral, Daily    multivitamin tablet 1 tablet, Daily    multivitamin tablet 1 tablet, Daily    rosuvastatin (CRESTOR) 20 mg, oral, Daily    sacubitriL-valsartan (Entresto)  mg tablet 1 tablet, oral, 2 times daily    sildenafil (VIAGRA) 50 mg, oral, Daily PRN    spironolactone (ALDACTONE) 12.5 mg, oral, Daily    vitamin A 8,000 Units, Daily    vitamin E 180 mg, Daily         Visit Vitals  BP 96/60 (BP Location: Right arm, Patient Position: Sitting)   Pulse 82   Ht 1.753 m (5' 9\")   Wt 71.9 kg (158 lb 9.6 oz)   SpO2 94%   BMI 23.42 kg/m²   Smoking Status Former   BSA 1.87 m²      Physical Exam  Constitutional:       Appearance: Normal appearance.   HENT:      Head: Normocephalic.     Cardiovascular:      Rate and Rhythm: Normal rate and regular rhythm.      Pulses: Normal pulses.      Heart sounds: No murmur heard.     Comments: left sided implant healed well, no ecchymosis, hematoma or drainage noted  Pulmonary:      Effort: Pulmonary effort is normal. No respiratory distress.     Musculoskeletal:         General: No swelling.     Skin:     General: Skin is warm and dry.     Neurological:      Mental Status: He is alert.     Psychiatric:         Mood and Affect: Mood normal.           My Interpretation of Reviewed Study(s):  Results  [id number] Score  Age >= 75: 2   Sex Male: 0   CHF History Yes: 1   HTN Yes: 1   Stroke/TIA/Thromboembolism No: 0   Vascular Dz: CAD/PAD/Aortic Plaque Yes: 1   DM No: 0   Total Score 5   Echocardiogram (May 2024): LVEF " 40-45%, Mildly dilated LA and Normal RA       Assessment & Plan  Paroxysmal atrial fibrillation  AF Dx History: 2017; h/o Cardioversion: Yes; AAD Use: None; Anticoagulation use: Apixaban 5mg BID (stopped due to Cost) Pradaxa 150mg BID (current); h/o Ablation: 2021; [id number] Score: 5  Atrial fibrillation status post ablation with low device-detected AF burden  AFib burden is low, less than 0.2%, with episodes between December and April lasting 4-6 hours. Symptoms include fluttering sensation without significant symptoms like fainting or severe dizziness. No current use of heart rhythm medications. Pradaxa is used for anticoagulation due to cost considerations over Eliquis. Potential for heart rhythm medications or redo ablation if AFib episodes increase. Redo ablation is not a cure but a treatment to minimize AFib duration. General anesthesia and recovery post-procedure may not be worth it given age and AFib's non-curable nature.  - Continue Pradaxa 150 mg twice daily  - Monitor AFib burden via device  - Discuss potential for heart rhythm medications or redo ablation if AFib episodes increase    Heart Failure with Reduced Ejection Fraction s/p ICD  Patient has a [employer] Dual chamber ICD.  Anticipated battery longevity 8 months (as of 7/8/25); RA pacing 94.8%, RV pacing 3.6%.; Arrhythmias noted on interrogation: 0.2%; Lead parameters stable with steady impedance and thresholds noted: Stable  - Continue to follow with device clinic as scheduled    Implanted cardiac defibrillator with pacemaker function, battery nearing replacement  Defibrillator implanted in February 2017. Battery has approximately 8 months of life as of July, with replacement anticipated around November or December.  Battery replacement planned when life is at 3 months. Remote monitoring will increase as battery life decreases. If battery dies, slower heart rates and feeling unwell expected. Battery replacement is a same-day procedure with  minimal recovery time.  - Plan for battery replacement when life is at 3 months  - Hold Pradaxa for a couple of days before battery replacement to minimize bleeding  - Instruct on post-procedure care: avoid lifting left arm above shoulder for 2 weeks, avoid water on incision for 1 week    Chronic systolic heart failure, well-compensated  Heart failure is well-compensated. No excessive shortness of breath. Engages in regular physical activity without significant limitations. Continues to take multiple medications for heart failure management.  - Continue metoprolol XL 25 mg once daily  - Continue Entresto 97/103 mg twice daily  - Continue Aldactone 12.5 mg once daily  - Continue Farxiga 10 mg once daily  - Use Lasix 20 mg as needed for bloating    History of myocardial infarction with prior stent placement  Hyperlipidemia in the setting of atherosclerotic cardiovascular disease  Continues to manage hyperlipidemia with medication.  - Continue rosuvastatin 20 mg daily    Return to Clinic: Patient should return to the EP Clinic in 1 year    Delio Benz MD [facility]  Cardiac Electrophysiology  [email or web address]    **Disclaimer: This note was dictated by speech recognition, and every effort has been made to prevent any error in transcription, however minor errors may be present**    This medical note was created with the assistance of artificial intelligence (AI) for documentation purposes. The content has been reviewed and confirmed by the healthcare provider for accuracy and completeness. Patient consented to the use of audio recording and use of AI during their visit.

## 2026-01-06 ENCOUNTER — APPOINTMENT (OUTPATIENT)
Dept: PRIMARY CARE | Facility: CLINIC | Age: 81
End: 2026-01-06
Payer: MEDICARE

## 2026-08-13 ENCOUNTER — APPOINTMENT (OUTPATIENT)
Dept: CARDIOLOGY | Facility: CLINIC | Age: 81
End: 2026-08-13
Payer: MEDICARE

## (undated) DEVICE — SUTURE, VICRYL, 4-0, 18 IN, UNDYED BR PS-2

## (undated) DEVICE — GLOVE, SURGICAL, PROTEXIS PI , 7.5, PF, LF

## (undated) DEVICE — DRAIN, PENROSE, 0.25 X 12 IN

## (undated) DEVICE — SOLUTION, IRRIGATION, STERILE WATER, 1000 ML, POUR BOTTLE

## (undated) DEVICE — DRESSING, GAUZE, SUPER KERLIX, 6X6

## (undated) DEVICE — SUTURE, PROLENE, 2-0, 36 IN, SH, DA, BLUE

## (undated) DEVICE — SUTURE, VICRYL, 3-0, 27 IN, SH

## (undated) DEVICE — Device

## (undated) DEVICE — SUTURE, PROLENE, 0, 30 IN, CT-2, BLUE

## (undated) DEVICE — STAPLER, SKIN, PLUS, WIDE, 35

## (undated) DEVICE — PREP, SCRUB, SKIN, HIBICLENS, 32 OZ

## (undated) DEVICE — PREP TRAY, SKIN, SCRUB, DISP, STERILE

## (undated) DEVICE — SUTURE, VICRYL, 2-0, 18 IN, UNDYED

## (undated) DEVICE — SPONGE, DISSECTOR, PEANUT, 3/8, STERILE 5 FOAM HOLDER"

## (undated) DEVICE — SOLUTION, IRRIGATION, SODIUM CHLORIDE 0.9%, 1000 ML, POUR BOTTLE

## (undated) DEVICE — SUPPORTER, ATHLETIC, ADULT, LARGE

## (undated) DEVICE — TOWEL PACK, STERILE, 4/PACK, BLUE

## (undated) DEVICE — DRAPE, SHEET, ENDOSCOPY, GENERAL, FENESTRATED, ARMBOARD COVER, 98 X 123.5 IN, DISPOSABLE, LF, STERILE